# Patient Record
Sex: FEMALE | Race: OTHER | URBAN - METROPOLITAN AREA
[De-identification: names, ages, dates, MRNs, and addresses within clinical notes are randomized per-mention and may not be internally consistent; named-entity substitution may affect disease eponyms.]

---

## 2020-07-24 ENCOUNTER — HOSPITAL ENCOUNTER (OUTPATIENT)
Dept: LAB | Age: 20
Discharge: HOME | End: 2020-07-24

## 2020-07-24 ENCOUNTER — INITIAL PRENATAL (OUTPATIENT)
Dept: FAMILY MEDICINE CLINIC | Age: 20
End: 2020-07-24

## 2020-07-24 VITALS
WEIGHT: 120.6 LBS | HEART RATE: 81 BPM | OXYGEN SATURATION: 98 % | HEIGHT: 60 IN | TEMPERATURE: 98.2 F | RESPIRATION RATE: 16 BRPM | DIASTOLIC BLOOD PRESSURE: 60 MMHG | SYSTOLIC BLOOD PRESSURE: 108 MMHG | BODY MASS INDEX: 23.68 KG/M2

## 2020-07-24 DIAGNOSIS — O09.30 LATE PRENATAL CARE: ICD-10-CM

## 2020-07-24 DIAGNOSIS — Z34.90 ENCOUNTER FOR SUPERVISION OF NORMAL PREGNANCY, ANTEPARTUM, UNSPECIFIED GRAVIDITY: ICD-10-CM

## 2020-07-24 DIAGNOSIS — Z34.90 ENCOUNTER FOR SUPERVISION OF NORMAL PREGNANCY, ANTEPARTUM, UNSPECIFIED GRAVIDITY: Primary | ICD-10-CM

## 2020-07-24 LAB
AMPHET UR QL SCN: NEGATIVE
ANTIBODY SCREEN, EXTERNAL: NEGATIVE
BARBITURATES UR QL SCN: NEGATIVE
BENZODIAZ UR QL: NEGATIVE
BILIRUB UR QL STRIP: NEGATIVE
CANNABINOIDS UR QL SCN: NEGATIVE
CHLAMYDIA, EXTERNAL: NEGATIVE
COCAINE UR QL SCN: NEGATIVE
COMMENT, HOLDF: NORMAL
DRUG SCRN COMMENT,DRGCM: NORMAL
ERYTHROCYTE [DISTWIDTH] IN BLOOD BY AUTOMATED COUNT: 13 % (ref 11.5–14.5)
GLUCOSE UR-MCNC: NEGATIVE MG/DL
HBSAG, EXTERNAL: NEGATIVE
HCT VFR BLD AUTO: 38.1 % (ref 35–47)
HGB BLD-MCNC: 11.9 G/DL (ref 11.5–16)
HIV, EXTERNAL: NON REACTIVE
KETONES P FAST UR STRIP-MCNC: NEGATIVE MG/DL
MCH RBC QN AUTO: 29.3 PG (ref 26–34)
MCHC RBC AUTO-ENTMCNC: 31.2 G/DL (ref 30–36.5)
MCV RBC AUTO: 93.8 FL (ref 80–99)
METHADONE UR QL: NEGATIVE
N. GONORRHEA, EXTERNAL: NEGATIVE
NRBC # BLD: 0 K/UL (ref 0–0.01)
NRBC BLD-RTO: 0 PER 100 WBC
OPIATES UR QL: NEGATIVE
PCP UR QL: NEGATIVE
PH UR STRIP: 6.5 [PH] (ref 4.6–8)
PLATELET # BLD AUTO: 285 K/UL (ref 150–400)
PMV BLD AUTO: 10.6 FL (ref 8.9–12.9)
PROT UR QL STRIP: NEGATIVE
RBC # BLD AUTO: 4.06 M/UL (ref 3.8–5.2)
RPR, EXTERNAL: NON REACTIVE
RUBELLA, EXTERNAL: NORMAL
SAMPLES BEING HELD,HOLD: NORMAL
SP GR UR STRIP: 1.02 (ref 1–1.03)
TYPE, ABO & RH, EXTERNAL: NORMAL
UA UROBILINOGEN AMB POC: NORMAL (ref 0.2–1)
URINALYSIS CLARITY POC: CLEAR
URINALYSIS COLOR POC: YELLOW
URINE BLOOD POC: NEGATIVE
URINE LEUKOCYTES POC: NORMAL
URINE NITRITES POC: NEGATIVE
WBC # BLD AUTO: 9.6 K/UL (ref 3.6–11)

## 2020-07-24 NOTE — PROGRESS NOTES
History and Physical    Patient: Kwasi Sotelo MRN: [id number]  SSN: [id number]    YOB: 2000  Age: 21 y.o. Sex: female      Subjective:      Kwasi Sotelo is a 21 y.o. female G1 at 25w10d who presents for IOB visit. Unplanned pregnancy, is happy about it  FOB involved, they live together  Does not work   Certain of LMP, they are regular   First pos pregnancy test: this month     History reviewed. No pertinent past medical history. History reviewed. No pertinent surgical history. History reviewed. No pertinent family history. Social History     Tobacco Use    Smoking status: Never Smoker    Smokeless tobacco: Never Used   Substance Use Topics    Alcohol use: Never     Frequency: Never      Prior to Admission medications    Medication Sig Start Date End Date Taking? Authorizing Provider   prenatal vit/iron fum/folic ac (RIGHT STEP PRENATAL VITAMINS PO) Take  by mouth. Yes Provider, Historical        Not on File    Review of Systems:  ROS negative except as noted in HPI. Objective:     Vitals:    07/24/20 1321   BP: 108/60   Pulse: 81   Resp: 16   Temp: 98.2 °F (36.8 °C)   TempSrc: Oral   SpO2: 98%   Weight: 120 lb 9.6 oz (54.7 kg)   Height: 4' 11.65\" (1.515 m)        Physical Exam:  See prenatal physical exam.    Assessment/Plan:   21yo G1 @ 25w5d by LMP  1. IUP: IOB labs, GTT, can confirm dating with anatomy scan, drawing NIPT today   2.   Late prenatal care: at 25 weeks, UDS     Signed By: Ruy Liu DO     July 24, 2020 [phone]

## 2020-07-24 NOTE — PROGRESS NOTES
Rosalind Lucas is a 21 y.o. female    Chief Complaint   Patient presents with    Initial Prenatal Visit     Patient is coming in for her initial prenatal visit. Her LMP was 2020 . ALON 2020. She is not having vaginal bleeding. She is having a white vaginal discharge wth no odor or itchiness. She is taking her prenatal vitamins. She is having fetal movement. She is othaving contractions. Never had a pap efore. No other concerns. 1. Have you been to the ER, urgent care clinic since your last visit? Hospitalized since your last visit? No  M  2. Have you seen or consulted any other health care providers outside of the [address] [place] [address] since your last visit? Include any pap smears or colon screening. No      Visit Vitals  /60 (BP 1 Location: Right arm, BP Patient Position: Sitting)   Pulse 81   Temp 98.2 °F (36.8 °C) (Oral)   Resp 16   Ht 4' 11.65\" (1.515 m)   Wt 120 lb 9.6 oz (54.7 kg)   SpO2 98%   BMI 23.83 kg/m²           Health Maintenance Due   Topic Date Due    DTaP/Tdap/Td series (1 - Tdap) 2007    HPV Age 9Y-34Y (1 - 2-dose series) 2011         Medication Reconciliation completed, changes noted.   Please  Update medication list.    Vaginal Bleeding: No  Vaginal discharge: white, no odor or itchiness  Fetal movement: Yes  Contractions: No  Prenatal Vitamins: Yes

## 2020-07-25 LAB
ABO + RH BLD: NORMAL
BLOOD BANK CMNT PATIENT-IMP: NORMAL
BLOOD GROUP ANTIBODIES SERPL: NORMAL
GLUCOSE 1H P 100 G GLC PO SERPL-MCNC: 104 MG/DL (ref 65–140)
HBV SURFACE AG SER QL: <0.1 INDEX
HBV SURFACE AG SER QL: NEGATIVE
HIV 1+2 AB+HIV1 P24 AG SERPL QL IA: NONREACTIVE
HIV12 RESULT COMMENT, HHIVC: NORMAL
RUBV IGG SER-IMP: REACTIVE
RUBV IGG SERPL IA-ACNC: 160 IU/ML
SPECIMEN EXP DATE BLD: NORMAL

## 2020-07-26 LAB
BACTERIA SPEC CULT: NORMAL
CC UR VC: NORMAL
SERVICE CMNT-IMP: NORMAL

## 2020-07-27 LAB
C TRACH DNA SPEC QL NAA+PROBE: NEGATIVE
N GONORRHOEA DNA SPEC QL NAA+PROBE: NEGATIVE
RPR SER QL: NONREACTIVE
SAMPLE TYPE: NORMAL
SERVICE CMNT-IMP: NORMAL
SPECIMEN SOURCE: NORMAL

## 2020-07-28 LAB
DEPRECATED HGB OTHER BLD-IMP: 0 %
HGB A MFR BLD: 97.3 % (ref 96.4–98.8)
HGB A2 MFR BLD COLUMN CHROM: 2.7 % (ref 1.8–3.2)
HGB C MFR BLD: 0 %
HGB F MFR BLD: 0 % (ref 0–2)
HGB FRACT BLD-IMP: NORMAL
HGB S BLD QL SOLY: NEGATIVE
HGB S MFR BLD: 0 %
VZV IGG SER IA-ACNC: 1444 INDEX

## 2020-07-30 ENCOUNTER — HOSPITAL ENCOUNTER (OUTPATIENT)
Dept: PERINATAL CARE | Age: 20
Discharge: HOME | End: 2020-07-30
Attending: OBSTETRICS & GYNECOLOGY
Payer: SUBSIDIZED

## 2020-07-30 PROCEDURE — 76805 OB US >/= 14 WKS SNGL FETUS: CPT | Performed by: OBSTETRICS & GYNECOLOGY

## 2020-08-14 ENCOUNTER — ROUTINE PRENATAL (OUTPATIENT)
Dept: FAMILY MEDICINE CLINIC | Age: 20
End: 2020-08-14
Payer: SUBSIDIZED

## 2020-08-14 VITALS
BODY MASS INDEX: 24.54 KG/M2 | HEIGHT: 60 IN | WEIGHT: 125 LBS | SYSTOLIC BLOOD PRESSURE: 105 MMHG | RESPIRATION RATE: 16 BRPM | TEMPERATURE: 97.8 F | DIASTOLIC BLOOD PRESSURE: 56 MMHG | HEART RATE: 81 BPM | OXYGEN SATURATION: 98 %

## 2020-08-14 DIAGNOSIS — Z34.90 ENCOUNTER FOR SUPERVISION OF NORMAL PREGNANCY, ANTEPARTUM, UNSPECIFIED GRAVIDITY: Primary | ICD-10-CM

## 2020-08-14 DIAGNOSIS — Z3A.28 28 WEEKS GESTATION OF PREGNANCY: ICD-10-CM

## 2020-08-14 DIAGNOSIS — O09.30 LATE PRENATAL CARE: ICD-10-CM

## 2020-08-14 LAB
BILIRUB UR QL STRIP: NEGATIVE
GLUCOSE UR-MCNC: NEGATIVE MG/DL
KETONES P FAST UR STRIP-MCNC: NEGATIVE MG/DL
PH UR STRIP: 6 [PH] (ref 4.6–8)
PROT UR QL STRIP: NEGATIVE
SP GR UR STRIP: 1.03 (ref 1–1.03)
UA UROBILINOGEN AMB POC: NORMAL (ref 0.2–1)
URINALYSIS CLARITY POC: CLEAR
URINALYSIS COLOR POC: NORMAL
URINE BLOOD POC: NEGATIVE
URINE LEUKOCYTES POC: NORMAL
URINE NITRITES POC: NEGATIVE

## 2020-08-14 PROCEDURE — 90471 IMMUNIZATION ADMIN: CPT | Performed by: FAMILY MEDICINE

## 2020-08-14 PROCEDURE — 0502F SUBSEQUENT PRENATAL CARE: CPT | Performed by: FAMILY MEDICINE

## 2020-08-14 PROCEDURE — 90715 TDAP VACCINE 7 YRS/> IM: CPT | Performed by: FAMILY MEDICINE

## 2020-08-14 NOTE — PROGRESS NOTES
Chief Complaint   Patient presents with    Routine Prenatal Visit       Patient identified with 2 patient identifiers (name and D. O. B)    Patient is a  at 28w5d    Leakage of Fluid: NO  Vaginal Bleeding: NO  Fetal Movement: YES  Prenatal vitamins: YES  Having Contractions: NO  Pain: NO    Visit Vitals  /56 (BP 1 Location: Left arm, BP Patient Position: Sitting)   Pulse 81   Temp 97.8 °F (36.6 °C) (Temporal)   Resp 16   Ht 4' 11.65\" (1.515 m)   Wt 125 lb (56.7 kg)   LMP 2020   SpO2 98%   BMI 24.70 kg/m²       Immunization History   Administered Date(s) Administered    Tdap 2020

## 2020-08-14 NOTE — PROGRESS NOTES
Return OB Visit   No concerns, baby moving    Objective:   /56 (BP 1 Location: Left arm, BP Patient Position: Sitting)   Pulse 81   Temp 97.8 °F (36.6 °C) (Temporal)   Resp 16   Ht 4' 11.65\" (1.515 m)   Wt 125 lb (56.7 kg)   LMP 2020   SpO2 98%   BMI 24.70 kg/m²     See flowsheet   Assessment       ICD-10-CM ICD-9-CM    1. Encounter for supervision of normal pregnancy, antepartum, unspecified   Z34.90 V22.1    2. 28 weeks gestation of pregnancy  Z3A.28 V22.2 AMB POC URINALYSIS DIP STICK AUTO W/O MICRO      TETANUS, DIPHTHERIA TOXOIDS AND ACELLULAR PERTUSSIS VACCINE (TDAP), IN INDIVIDS. >=7, IM      WA IMMUNIZ ADMIN,1 SINGLE/COMB VAC/TOXOID     Plan   19yo G1 @ 28w5d by LMP=26wk scan   1. IUP: RH pos, GTT wnl, NIPT wnl, anatomy normal, s/p tdap   2.   Late prenatal care: at 25 weeks, UDS neg    Orders Placed This Encounter    WA IMMUNIZ ADMIN,1 SINGLE/COMB VAC/TOXOID    TETANUS, DIPHTHERIA TOXOIDS AND ACELLULAR PERTUSSIS VACCINE (TDAP), IN INDIVIDS. >=7, IM    AMB POC URINALYSIS DIP STICK AUTO W/O MICRO         Kiana Bergeorn,

## 2020-08-24 ENCOUNTER — TELEPHONE (OUTPATIENT)
Dept: FAMILY MEDICINE CLINIC | Age: 20
End: 2020-08-24

## 2020-08-26 ENCOUNTER — ROUTINE PRENATAL (OUTPATIENT)
Dept: FAMILY MEDICINE CLINIC | Age: 20
End: 2020-08-26

## 2020-08-31 ENCOUNTER — ROUTINE PRENATAL (OUTPATIENT)
Dept: FAMILY MEDICINE CLINIC | Age: 20
End: 2020-08-31

## 2020-08-31 NOTE — PROGRESS NOTES
[facility] #[id number]    Attempted to call patient but phone no is incorrect and the person that answered is not Ms. Albarado. Called friend Deborah) on the HIPAA form who picked up but then did not speak when asked about Ms. Albarado information. Patient does have a follow up visit in clinic on 9/11.

## 2020-08-31 NOTE — Clinical Note
LUIS    Attempted to call patient but phone no is incorrect and the person that answered is not Ms. Albarado. Called friend Deborah) on the HIPAA form who picked up but then did not speak when asked about MsAlvarado Verena information. Patient does have a follow up visit in clinic on 9/11.

## 2020-09-10 ENCOUNTER — APPOINTMENT (OUTPATIENT)
Dept: FAMILY MEDICINE CLINIC | Age: 20
End: 2020-09-10

## 2020-09-25 ENCOUNTER — ROUTINE PRENATAL (OUTPATIENT)
Dept: FAMILY MEDICINE CLINIC | Age: 20
End: 2020-09-25
Payer: SUBSIDIZED

## 2020-09-25 VITALS
OXYGEN SATURATION: 98 % | WEIGHT: 135 LBS | DIASTOLIC BLOOD PRESSURE: 74 MMHG | HEART RATE: 80 BPM | SYSTOLIC BLOOD PRESSURE: 121 MMHG | HEIGHT: 60 IN | TEMPERATURE: 98.4 F | BODY MASS INDEX: 26.5 KG/M2

## 2020-09-25 DIAGNOSIS — Z3A.34 34 WEEKS GESTATION OF PREGNANCY: Primary | ICD-10-CM

## 2020-09-25 DIAGNOSIS — O09.30 LATE PRENATAL CARE: ICD-10-CM

## 2020-09-25 LAB
BILIRUB UR QL STRIP: NEGATIVE
GLUCOSE UR-MCNC: NEGATIVE MG/DL
KETONES P FAST UR STRIP-MCNC: NEGATIVE MG/DL
PH UR STRIP: 7 [PH] (ref 4.6–8)
PROT UR QL STRIP: NEGATIVE
SP GR UR STRIP: 1.01 (ref 1–1.03)
UA UROBILINOGEN AMB POC: NORMAL (ref 0.2–1)
URINALYSIS CLARITY POC: CLEAR
URINALYSIS COLOR POC: YELLOW
URINE BLOOD POC: NEGATIVE
URINE LEUKOCYTES POC: NORMAL
URINE NITRITES POC: NEGATIVE

## 2020-09-25 PROCEDURE — 0502F SUBSEQUENT PRENATAL CARE: CPT | Performed by: FAMILY MEDICINE

## 2020-09-25 PROCEDURE — 81003 URINALYSIS AUTO W/O SCOPE: CPT | Performed by: FAMILY MEDICINE

## 2020-09-25 PROCEDURE — 90471 IMMUNIZATION ADMIN: CPT | Performed by: FAMILY MEDICINE

## 2020-09-25 PROCEDURE — 90686 IIV4 VACC NO PRSV 0.5 ML IM: CPT | Performed by: FAMILY MEDICINE

## 2020-09-25 NOTE — PROGRESS NOTES
Chief Complaint   Patient presents with    Routine Prenatal Visit     . 34w5d. NO LOF, no bleeding. +fetal movement. No daylin shaw     Visit Vitals  /74 (BP 1 Location: Left arm, BP Patient Position: Sitting)   Pulse 80   Temp 98.4 °F (36.9 °C) (Temporal)   Ht 4' 11.65\" (1.515 m)   Wt 135 lb (61.2 kg)   SpO2 98%   BMI 26.68 kg/m²     1. Have you been to the ER, urgent care clinic since your last visit? Hospitalized since your last visit? No    2. Have you seen or consulted any other health care providers outside of the [address] [place] [address] since your last visit? Include any pap smears or colon screening.  No

## 2020-09-25 NOTE — PROGRESS NOTES
I reviewed with the resident the medical history and the resident's findings on the physical examination. I discussed with the resident the patient's diagnosis and concur with the plan. 21yo G1 @ 34w5d by LMP=26wk scan   1. IUP: RH pos, GTT wnl, NIPT wnl, anatomy normal, s/p tdap and flu   2. Late prenatal care: at 25 weeks, UDS neg  3.   S<D: scheduling growth scan

## 2020-09-25 NOTE — PROGRESS NOTES
Return OB Visit       Subjective:   Milan Verduzco 21 y.o. Lorenzo Gavel   ALON: 11/1/2020, Date entered prior to episode creation  GA: 34w5d. States she does not have abdominal pain, chest pain, contractions, fever, headache, nausea and vomiting, pelvic pressure, right upper quadrant pain, shortness of breath, swelling, vaginal bleeding, vaginal leaking of fluid  and visual disturbances, + fetal movement. She is taking PNV and she is eating healthy. Phone no [phone], will have Namrata update the chart     Allergies- reviewed:   No Known Allergies  Medications- reviewed:   Current Outpatient Medications   Medication Sig    prenatal vit/iron fum/folic ac (RIGHT STEP PRENATAL VITAMINS PO) Take  by mouth. No current facility-administered medications for this visit. Past Medical History- reviewed:  No past medical history on file. Past Surgical History- reviewed:   No past surgical history on file.   Social History- reviewed:  Social History     Socioeconomic History    Marital status: SINGLE     Spouse name: Not on file    Number of children: Not on file    Years of education: Not on file    Highest education level: Not on file   Occupational History    Not on file   Social Needs    Financial resource strain: Not on file    Food insecurity     Worry: Not on file     Inability: Not on file    Transportation needs     Medical: Not on file     Non-medical: Not on file   Tobacco Use    Smoking status: Never Smoker    Smokeless tobacco: Never Used   Substance and Sexual Activity    Alcohol use: Never     Frequency: Never    Drug use: Never    Sexual activity: Yes   Lifestyle    Physical activity     Days per week: Not on file     Minutes per session: Not on file    Stress: Not on file   Relationships    Social connections     Talks on phone: Not on file     Gets together: Not on file     Attends Jew service: Not on file     Active member of club or organization: Not on file Attends meetings of clubs or organizations: Not on file     Relationship status: Not on file    Intimate partner violence     Fear of current or ex partner: Not on file     Emotionally abused: Not on file     Physically abused: Not on file     Forced sexual activity: Not on file   Other Topics Concern    Not on file   Social History Narrative    Not on file     Immunizations- reviewed:   Immunization History   Administered Date(s) Administered    Influenza Vaccine (Quad) PF 2020    Tdap 2020     Objective:     Visit Vitals  /74 (BP 1 Location: Left arm, BP Patient Position: Sitting)   Pulse 80   Temp 98.4 °F (36.9 °C) (Temporal)   Ht 4' 11.65\" (1.515 m)   Wt 135 lb (61.2 kg)   LMP 2020   SpO2 98%   BMI 26.68 kg/m²       Physical Exam:  GENERAL APPEARANCE: alert, well appearing, in no apparent distress  ABDOMEN: gravid, fundal height 30 cm, FHT present at 150 bpm  PSYCH: normal mood and affect    Labs  Recent Results (from the past 12 hour(s))   AMB POC URINALYSIS DIP STICK AUTO W/O MICRO    Collection Time: 20  2:04 PM   Result Value Ref Range    Color (UA POC) Yellow     Clarity (UA POC) Clear     Glucose (UA POC) Negative Negative    Bilirubin (UA POC) Negative Negative    Ketones (UA POC) Negative Negative    Specific gravity (UA POC) 1.015 1.001 - 1.035    Blood (UA POC) Negative Negative    pH (UA POC) 7.0 4.6 - 8.0    Protein (UA POC) Negative Negative    Urobilinogen (UA POC) 0.2 mg/dL 0.2 - 1    Nitrites (UA POC) Negative Negative    Leukocyte esterase (UA POC) 1+ Negative         Plan   20 y.o.  34w5d ALON 2020, Date entered prior to episode creation here for return OB visit     SIUP   - RH pos, Antibody screen- neg, Rb immune, VZ immune, HBsAg titer- neg, RPR- neg, HIV- neg, pap smear- NILM, gonorrhea/chlamydia- neg, UCX mixed urogenital henna, 1 hr GTT wnl   - Anatomy scan wnl, follow up as CI \  - NIPT low risk     Pregnancy Problems:  - Late prenatal care: at 25 weeks, UDS neg  - S<D: Measuring 30 weeks today at 34w5d, will obtain growth scan    Today's visit:   - UA: 1+ LE, otherwise normal   - Flu vaccine today     Follow up visit: Follow up in 2 weeks (10/12 with Dr. Isabella Estrella)    Orders Placed This Encounter    INFLUENZA VIRUS VAC QUAD,SPLIT,PRESV FREE SYRINGE IM (Flulaval, Fluzone, Fluarix) (32640)    AMB POC URINALYSIS DIP STICK AUTO W/O MICRO     Labor precautions discussed, including: Regular painful contractions, lasting for greater than one hour, taking your breath away; any vaginal bleeding; any leakage of fluid; or absent or decreased fetal movement. Call M.D. on call if any of these symptoms or signs occur. I have discussed the diagnosis with the patient and the intended plan as seen in the above orders. The patient has received an after-visit summary and questions were answered concerning future plans. I have discussed medication side effects and warnings with the patient as well. Informed pt to return to the office or go to the ER if she experiences vaginal bleeding, vaginal discharge, leaking of fluid, pelvic cramping.     Pt discussed with Dr. Carolina Feliz (attending physician)    Tacho Aldana DO  Family Medicine Resident

## 2020-10-05 ENCOUNTER — HOSPITAL ENCOUNTER (OUTPATIENT)
Dept: PERINATAL CARE | Age: 20
Discharge: HOME | End: 2020-10-05
Attending: OBSTETRICS & GYNECOLOGY

## 2020-10-12 ENCOUNTER — ROUTINE PRENATAL (OUTPATIENT)
Dept: FAMILY MEDICINE CLINIC | Age: 20
End: 2020-10-12
Payer: SUBSIDIZED

## 2020-10-12 VITALS
OXYGEN SATURATION: 99 % | SYSTOLIC BLOOD PRESSURE: 120 MMHG | DIASTOLIC BLOOD PRESSURE: 70 MMHG | RESPIRATION RATE: 18 BRPM | TEMPERATURE: 97.8 F | HEART RATE: 72 BPM | HEIGHT: 60 IN | WEIGHT: 141 LBS | BODY MASS INDEX: 27.68 KG/M2

## 2020-10-12 DIAGNOSIS — Z3A.37 37 WEEKS GESTATION OF PREGNANCY: Primary | ICD-10-CM

## 2020-10-12 DIAGNOSIS — Z3A.37 37 WEEKS GESTATION OF PREGNANCY: ICD-10-CM

## 2020-10-12 DIAGNOSIS — Z34.90 PRENATAL CARE, ANTEPARTUM: ICD-10-CM

## 2020-10-12 LAB
BILIRUB UR QL STRIP: NEGATIVE
GLUCOSE UR-MCNC: NEGATIVE MG/DL
GRBS, EXTERNAL: NEGATIVE
KETONES P FAST UR STRIP-MCNC: NEGATIVE MG/DL
PH UR STRIP: 7 [PH] (ref 4.6–8)
PROT UR QL STRIP: NEGATIVE
SP GR UR STRIP: 1.02 (ref 1–1.03)
UA UROBILINOGEN AMB POC: NORMAL (ref 0.2–1)
URINALYSIS CLARITY POC: NORMAL
URINALYSIS COLOR POC: YELLOW
URINE BLOOD POC: NEGATIVE
URINE LEUKOCYTES POC: NORMAL
URINE NITRITES POC: NEGATIVE

## 2020-10-12 PROCEDURE — 0502F SUBSEQUENT PRENATAL CARE: CPT | Performed by: STUDENT IN AN ORGANIZED HEALTH CARE EDUCATION/TRAINING PROGRAM

## 2020-10-12 NOTE — PATIENT INSTRUCTIONS
Semana 40 de sauceda embarazo: Instrucciones de cuidado  Week 37 of Your Pregnancy: Care Instructions  Instrucciones de cuidado    Usted está cerca del final de sauceda embarazo, y probablemente esté bastante incómoda. Puede ser más difícil caminar. Acostarse probablemente tampoco sea cómodo. Podría tener dificultad para dormir o para permanecer dormida. La mayoría de las mujeres miya a nita entre las 40 y 43 semanas. Farnaz es un buen momento para pensar en preparar un maletín para el hospital con los artículos que necesitará. Entonces estará lista para cuando comience el Vicki David Galvan. La atención de seguimiento es alejandra parte clave de sauceda tratamiento y seguridad. Asegúrese de hacer y acudir a todas las citas, y llame a sauceda médico si está teniendo problemas. También es alejandra buena idea saber los resultados de zeina exámenes y mantener alejandra lista de los medicamentos que natasha. ¿Cómo puede cuidarse en el hogar? Aprenda sobre el amamantamiento  · El amamantamiento es lo mejor para sauceda bebé y Kerri Gravely es monahan para usted. · La leche materna tiene anticuerpos que ayudan al bebé a combatir las infecciones. · Las madres que amamantan suelen bajar de peso más rápidamente, debido a que elaborar leche quema calorías. · Informarse acerca de las mejores maneras de sostener a sauceda bebé le facilitará el amamantamiento. · Deje que sauceda damir bañe y [employer] pañales del bebé para que no se sienta excluida. Acurrúquense juntos cuando amamante a sauceda bebé. · Es posible que desee aprender a usar un sacaleches y guardar sauceda [place]. · Si elige alimentar a sauceda bebé con biberón, hágalo de la [employer] resulte más parecida al amamantamiento para que pueda establecer un vínculo con sauceda bebé. Siempre sostenga al bebé y el biberón. No apoye el biberón ni deje que sauceda bebé se quede dormido con él. Aprenda sobre el llanto  · Es normal que los bebés lloren de 1 a 3 horas al día. Algunos lloran más, otros menos.   · Los bebés no lloran para causarle molestias ni porque usted sea [employer]. · Llorar es la forma de comunicarse que tiene el bebé. Sauceda bebé puede [employer] o gases, necesitar un cambio de pañal, sentir frío o calor, sentirse solo o tenso. A veces, los bebés lloran por motivos desconocidos. · Si usted responde a las necesidades de sauceda bebé, luis fernando aprenderá a confiar en usted. · Intente mantener la calma cuando sauceda bebé llore. Sauceda bebé se puede sentir más molesto si siente que usted [place]. Sepa cómo cuidar a sauceda recién nacido  · El muñón del cordón umbilical de sauceda bebé se caerá solo, por lo general entre las semanas 1 y 2. Para cuidar la juanita del cordón umbilical de sauceda bebé:  ? Limpie la juanita de la parte inferior del cordón umbilical 2 o 3 veces al día. ? Ponga especial atención en la juanita en donde el cordón se fija a la piel. ? Mantenga el pañal doblado debajo del cordón. ? Utilice alejandra toallita húmeda o algodón para darle un baño de esponja a sauceda bebé hasta que se le haya caído el muñón. · La primera evacuación intestinal oscura de sauceda bebé se conoce katherine meconio. Después del meconio, el bebé tendrá zeina propios hábitos de evacuación intestinal.  ? Algunos bebés, especialmente aquellos que se alimentan con [address], tienen varias evacuaciones al día. Otros tienen [employer] al día, o alejandra cada 2 o 3 días. ? Los bebés que son amamantados a menudo tienen evacuaciones sueltas amarillentas. Los bebés que se alimentan con leche de fórmula evacuan heces más sólidas. ? Si sauceda bebé tiene evacuaciones katherine bolitas pequeñas, está estreñido. Después de 2 wendie de estreñimiento, llame al médico de sauceda bebé. · Si sauceda bebé va a ser Josy Risk, usted puede cuidarlo en el hogar. ? Enjuáguele delicadamente el pene con agua tibia cada vez que le cambie los pañales. No intente retirar la membrana que se forma sobre el pene. Esta membrana desaparecerá por sí vicky. Séquele la juanita con toques suaves de toalla.   ? [employer] a base de petróleo, katherine cassidya, en la juanita del pañal que tendrá contacto con el pene de sauceda bebé. Farmers evitará que el pañal se le pegue al bebé. ? Pregúntele al médico acerca de darle acetaminofén (Tylenol) a sauceda bebé para el dolor. ¿Dónde puede encontrar más información en inglés? Vaya a [email or web address]ox.com/  Abiodun [id number] en la búsqueda para aprender más acerca de \"Semana 40 de sauceda embarazo: Instrucciones de cuidado. \"  Revisado: 11 febrero, 2020               Versión del contenido: 12.6  © 4069-0163 [employer]. Las instrucciones de cuidado fueron adaptadas bajo licencia por [employer] (which disclaims liability or warranty for this information). Si usted tiene [place] afección médica o sobre estas instrucciones, siempre pregunte a sauceda profesional de mickey. [employer] niega toda garantía o responsabilidad por sauceda uso de esta información.

## 2020-10-12 NOTE — PROGRESS NOTES
Identified Patient with two Patient identifiers (Name and ). Two Patient Identifiers confirmed. Reviewed record in preparation for visit and have obtained necessary documentation. Chief Complaint   Patient presents with    Routine Prenatal Visit     37w1d       Visit Vitals  /70 (BP 1 Location: Right arm, BP Patient Position: Sitting)   Pulse 72   Temp 97.8 °F (36.6 °C) (Temporal)   Resp 18   Ht 4' 11.5\" (1.511 m)   Wt 141 lb (64 kg)   SpO2 99%   BMI 28.00 kg/m²       1. Have you been to the ER, urgent care clinic since your last visit? Hospitalized since your last visit? No    2. Have you seen or consulted any other health care providers outside of the [address] [place] [address] since your last visit? Include any pap smears or colon screening.  No

## 2020-10-12 NOTE — PROGRESS NOTES
I reviewed with the resident the medical history and the resident's findings on the physical examination. I discussed with the resident the patient's diagnosis and concur with the plan. 19yo G1 @ 37w1d by LMP=26wk scan   1. IUP: RH pos, GTT wnl, NIPT wnl, anatomy normal, s/p tdap and flu, GBS collected   2. Late prenatal care: at 25 weeks, UDS neg  3.   S<D: 18th% on 10/5, f/up as CI per M

## 2020-10-15 LAB
BACTERIA SPEC CULT: NORMAL
SERVICE CMNT-IMP: NORMAL

## 2020-10-16 ENCOUNTER — ANESTHESIA (OUTPATIENT)
Dept: LABOR AND DELIVERY | Age: 20
End: 2020-10-16
Payer: SUBSIDIZED

## 2020-10-16 ENCOUNTER — HOSPITAL ENCOUNTER (INPATIENT)
Age: 20
LOS: 2 days | Discharge: HOME | End: 2020-10-18
Attending: FAMILY MEDICINE | Admitting: OBSTETRICS & GYNECOLOGY
Payer: SUBSIDIZED

## 2020-10-16 ENCOUNTER — ANESTHESIA EVENT (OUTPATIENT)
Dept: LABOR AND DELIVERY | Age: 20
End: 2020-10-16
Payer: SUBSIDIZED

## 2020-10-16 PROBLEM — Z34.90 PREGNANCY: Status: ACTIVE | Noted: 2020-10-16

## 2020-10-16 LAB
ALBUMIN SERPL-MCNC: 2.8 G/DL (ref 3.5–5)
ALBUMIN/GLOB SERPL: 0.7 {RATIO} (ref 1.1–2.2)
ALP SERPL-CCNC: 197 U/L (ref 45–117)
ALT SERPL-CCNC: 19 U/L (ref 12–78)
ANION GAP SERPL CALC-SCNC: 7 MMOL/L (ref 5–15)
AST SERPL-CCNC: 17 U/L (ref 15–37)
BASOPHILS # BLD: 0.1 K/UL (ref 0–0.1)
BASOPHILS NFR BLD: 0 % (ref 0–1)
BILIRUB SERPL-MCNC: 0.3 MG/DL (ref 0.2–1)
BUN SERPL-MCNC: 8 MG/DL (ref 6–20)
BUN/CREAT SERPL: 14 (ref 12–20)
CALCIUM SERPL-MCNC: 8.9 MG/DL (ref 8.5–10.1)
CHLORIDE SERPL-SCNC: 109 MMOL/L (ref 97–108)
CO2 SERPL-SCNC: 22 MMOL/L (ref 21–32)
COMMENT, HOLDF: NORMAL
COVID-19 RAPID TEST, COVR: NOT DETECTED
CREAT SERPL-MCNC: 0.58 MG/DL (ref 0.55–1.02)
CREAT UR-MCNC: 90 MG/DL
DIFFERENTIAL METHOD BLD: ABNORMAL
EOSINOPHIL # BLD: 0.2 K/UL (ref 0–0.4)
EOSINOPHIL NFR BLD: 2 % (ref 0–7)
ERYTHROCYTE [DISTWIDTH] IN BLOOD BY AUTOMATED COUNT: 14 % (ref 11.5–14.5)
GLOBULIN SER CALC-MCNC: 4.2 G/DL (ref 2–4)
GLUCOSE SERPL-MCNC: 83 MG/DL (ref 65–100)
HCT VFR BLD AUTO: 34.5 % (ref 35–47)
HEALTH STATUS, XMCV2T: NORMAL
HGB BLD-MCNC: 10.9 G/DL (ref 11.5–16)
IMM GRANULOCYTES # BLD AUTO: 0.1 K/UL (ref 0–0.04)
IMM GRANULOCYTES NFR BLD AUTO: 1 % (ref 0–0.5)
LDH SERPL L TO P-CCNC: 159 U/L (ref 81–246)
LYMPHOCYTES # BLD: 1.9 K/UL (ref 0.8–3.5)
LYMPHOCYTES NFR BLD: 15 % (ref 12–49)
MCH RBC QN AUTO: 25.7 PG (ref 26–34)
MCHC RBC AUTO-ENTMCNC: 31.6 G/DL (ref 30–36.5)
MCV RBC AUTO: 81.4 FL (ref 80–99)
MONOCYTES # BLD: 0.9 K/UL (ref 0–1)
MONOCYTES NFR BLD: 7 % (ref 5–13)
NEUTS SEG # BLD: 9.5 K/UL (ref 1.8–8)
NEUTS SEG NFR BLD: 75 % (ref 32–75)
NRBC # BLD: 0 K/UL (ref 0–0.01)
NRBC BLD-RTO: 0 PER 100 WBC
PLATELET # BLD AUTO: 384 K/UL (ref 150–400)
PMV BLD AUTO: 11.4 FL (ref 8.9–12.9)
POTASSIUM SERPL-SCNC: 4.1 MMOL/L (ref 3.5–5.1)
PROT SERPL-MCNC: 7 G/DL (ref 6.4–8.2)
PROT UR-MCNC: 26 MG/DL (ref 0–11.9)
PROT/CREAT UR-RTO: 0.3
RBC # BLD AUTO: 4.24 M/UL (ref 3.8–5.2)
SAMPLES BEING HELD,HOLD: NORMAL
SODIUM SERPL-SCNC: 138 MMOL/L (ref 136–145)
SOURCE, COVRS: NORMAL
SPECIMEN SOURCE, FCOV2M: NORMAL
SPECIMEN TYPE, XMCV1T: NORMAL
URATE SERPL-MCNC: 4.4 MG/DL (ref 2.6–6)
WBC # BLD AUTO: 12.7 K/UL (ref 3.6–11)

## 2020-10-16 PROCEDURE — 36415 COLL VENOUS BLD VENIPUNCTURE: CPT

## 2020-10-16 PROCEDURE — 85025 COMPLETE CBC W/AUTO DIFF WBC: CPT

## 2020-10-16 PROCEDURE — 84156 ASSAY OF PROTEIN URINE: CPT

## 2020-10-16 PROCEDURE — 75410000003 HC RECOV DEL/VAG/CSECN EA 0.5 HR: Performed by: OBSTETRICS & GYNECOLOGY

## 2020-10-16 PROCEDURE — 74011250636 HC RX REV CODE- 250/636: Performed by: OBSTETRICS & GYNECOLOGY

## 2020-10-16 PROCEDURE — 74011250636 HC RX REV CODE- 250/636: Performed by: ANESTHESIOLOGY

## 2020-10-16 PROCEDURE — 77030014125 HC TY EPDRL BBMI -B: Performed by: ANESTHESIOLOGY

## 2020-10-16 PROCEDURE — 76060000078 HC EPIDURAL ANESTHESIA: Performed by: ANESTHESIOLOGY

## 2020-10-16 PROCEDURE — 76815 OB US LIMITED FETUS(S): CPT | Performed by: OBSTETRICS & GYNECOLOGY

## 2020-10-16 PROCEDURE — 75410000002 HC LABOR FEE PER 1 HR: Performed by: OBSTETRICS & GYNECOLOGY

## 2020-10-16 PROCEDURE — 59025 FETAL NON-STRESS TEST: CPT

## 2020-10-16 PROCEDURE — 74011000250 HC RX REV CODE- 250

## 2020-10-16 PROCEDURE — 75810000275 HC EMERGENCY DEPT VISIT NO LEVEL OF CARE

## 2020-10-16 PROCEDURE — 00HU33Z INSERTION OF INFUSION DEVICE INTO SPINAL CANAL, PERCUTANEOUS APPROACH: ICD-10-PCS | Performed by: ANESTHESIOLOGY

## 2020-10-16 PROCEDURE — 3E0R3BZ INTRODUCTION OF ANESTHETIC AGENT INTO SPINAL CANAL, PERCUTANEOUS APPROACH: ICD-10-PCS | Performed by: ANESTHESIOLOGY

## 2020-10-16 PROCEDURE — 65270000029 HC RM PRIVATE

## 2020-10-16 PROCEDURE — 75410000000 HC DELIVERY VAGINAL/SINGLE: Performed by: OBSTETRICS & GYNECOLOGY

## 2020-10-16 PROCEDURE — 74011250636 HC RX REV CODE- 250/636: Performed by: STUDENT IN AN ORGANIZED HEALTH CARE EDUCATION/TRAINING PROGRAM

## 2020-10-16 PROCEDURE — 74011000250 HC RX REV CODE- 250: Performed by: ANESTHESIOLOGY

## 2020-10-16 PROCEDURE — 83615 LACTATE (LD) (LDH) ENZYME: CPT

## 2020-10-16 PROCEDURE — 80053 COMPREHEN METABOLIC PANEL: CPT

## 2020-10-16 PROCEDURE — 84550 ASSAY OF BLOOD/URIC ACID: CPT

## 2020-10-16 PROCEDURE — 87635 SARS-COV-2 COVID-19 AMP PRB: CPT

## 2020-10-16 RX ORDER — ACETAMINOPHEN 325 MG/1
650 TABLET ORAL
Status: DISCONTINUED | OUTPATIENT
Start: 2020-10-16 | End: 2020-10-17 | Stop reason: HOSPADM

## 2020-10-16 RX ORDER — LIDOCAINE HYDROCHLORIDE 10 MG/ML
20 INJECTION INFILTRATION; PERINEURAL ONCE
Status: COMPLETED | OUTPATIENT
Start: 2020-10-17 | End: 2020-10-16

## 2020-10-16 RX ORDER — NALBUPHINE HYDROCHLORIDE 10 MG/ML
10 INJECTION, SOLUTION INTRAMUSCULAR; INTRAVENOUS; SUBCUTANEOUS
Status: DISCONTINUED | OUTPATIENT
Start: 2020-10-16 | End: 2020-10-17 | Stop reason: HOSPADM

## 2020-10-16 RX ORDER — EPHEDRINE SULFATE/0.9% NACL/PF 50 MG/5 ML
10 SYRINGE (ML) INTRAVENOUS
Status: DISCONTINUED | OUTPATIENT
Start: 2020-10-16 | End: 2020-10-17 | Stop reason: HOSPADM

## 2020-10-16 RX ORDER — SODIUM CHLORIDE, SODIUM LACTATE, POTASSIUM CHLORIDE, CALCIUM CHLORIDE 600; 310; 30; 20 MG/100ML; MG/100ML; MG/100ML; MG/100ML
125 INJECTION, SOLUTION INTRAVENOUS CONTINUOUS
Status: DISCONTINUED | OUTPATIENT
Start: 2020-10-16 | End: 2020-10-18 | Stop reason: HOSPADM

## 2020-10-16 RX ORDER — FENTANYL CITRATE 50 UG/ML
INJECTION, SOLUTION INTRAMUSCULAR; INTRAVENOUS AS NEEDED
Status: DISCONTINUED | OUTPATIENT
Start: 2020-10-16 | End: 2020-10-16 | Stop reason: HOSPADM

## 2020-10-16 RX ORDER — OXYTOCIN/RINGER'S LACTATE 30/500 ML
10 PLASTIC BAG, INJECTION (ML) INTRAVENOUS AS NEEDED
Status: COMPLETED | OUTPATIENT
Start: 2020-10-16 | End: 2020-10-17

## 2020-10-16 RX ORDER — LIDOCAINE HYDROCHLORIDE 10 MG/ML
INJECTION INFILTRATION; PERINEURAL
Status: COMPLETED
Start: 2020-10-16 | End: 2020-10-16

## 2020-10-16 RX ORDER — OXYTOCIN/RINGER'S LACTATE 30/500 ML
95 PLASTIC BAG, INJECTION (ML) INTRAVENOUS AS NEEDED
Status: DISCONTINUED | OUTPATIENT
Start: 2020-10-16 | End: 2020-10-18 | Stop reason: HOSPADM

## 2020-10-16 RX ORDER — LIDOCAINE HYDROCHLORIDE AND EPINEPHRINE 15; 5 MG/ML; UG/ML
INJECTION, SOLUTION EPIDURAL AS NEEDED
Status: DISCONTINUED | OUTPATIENT
Start: 2020-10-16 | End: 2020-10-16 | Stop reason: HOSPADM

## 2020-10-16 RX ORDER — FENTANYL/BUPIVACAINE/NS/PF 2-1250MCG
10 PREFILLED PUMP RESERVOIR EPIDURAL CONTINUOUS
Status: DISCONTINUED | OUTPATIENT
Start: 2020-10-16 | End: 2020-10-17 | Stop reason: HOSPADM

## 2020-10-16 RX ADMIN — OXYTOCIN 30000 MILLI-UNITS: 10 INJECTION, SOLUTION INTRAMUSCULAR; INTRAVENOUS at 23:45

## 2020-10-16 RX ADMIN — FENTANYL CITRATE 75 MCG: 0.05 INJECTION, SOLUTION INTRAMUSCULAR; INTRAVENOUS at 19:54

## 2020-10-16 RX ADMIN — SODIUM CHLORIDE, SODIUM LACTATE, POTASSIUM CHLORIDE, AND CALCIUM CHLORIDE 125 ML/HR: 600; 310; 30; 20 INJECTION, SOLUTION INTRAVENOUS at 17:40

## 2020-10-16 RX ADMIN — FENTANYL CITRATE 25 MCG: 0.05 INJECTION, SOLUTION INTRAMUSCULAR; INTRAVENOUS at 19:47

## 2020-10-16 RX ADMIN — Medication 10 ML/HR: at 20:12

## 2020-10-16 RX ADMIN — SODIUM CHLORIDE, SODIUM LACTATE, POTASSIUM CHLORIDE, AND CALCIUM CHLORIDE 125 ML/HR: 600; 310; 30; 20 INJECTION, SOLUTION INTRAVENOUS at 20:15

## 2020-10-16 RX ADMIN — LIDOCAINE HYDROCHLORIDE AND EPINEPHRINE 3.5 ML: 15; 5 INJECTION, SOLUTION EPIDURAL at 19:54

## 2020-10-16 RX ADMIN — SODIUM CHLORIDE, SODIUM LACTATE, POTASSIUM CHLORIDE, AND CALCIUM CHLORIDE 1000 ML: 600; 310; 30; 20 INJECTION, SOLUTION INTRAVENOUS at 19:15

## 2020-10-16 RX ADMIN — LIDOCAINE HYDROCHLORIDE 10 ML: 10 INJECTION, SOLUTION INFILTRATION; PERINEURAL at 23:45

## 2020-10-16 RX ADMIN — LIDOCAINE HYDROCHLORIDE 10 ML: 10 INJECTION INFILTRATION; PERINEURAL at 23:45

## 2020-10-16 NOTE — PROGRESS NOTES
1355 pt arrived from ed, with complaint of contractions q 5 minutes, pt states she vomited x 1.   Placed pt on efm and using interperter [id number] through stratus, pt denies leakage of fluid or vaginal bleeding, pt states she does feel fetal movement  1405 pt states she has not drank any water today and last ate this morning  1432 residence in and aware of bp ranges and pt arrived for complaint of contraction, confirming vtx position, sve performed by resident and myself    1520 dr Samy Allen at nurses station reviewed efm tracing and bp ranges, order received to let md know if bp is 140 or greater systolic    8539 sbar report given to isael gaspar rn

## 2020-10-16 NOTE — H&P
[address] [facility] [address] [place] [address]   Office [phone], Fax [phone]      History & Physical    Name: Luigi Kellogg MRN: [id number]  SSN: [id number]    YOB: 2000  Age: 21 y.o. Sex: female      Subjective:     Reason for Admission:  Pregnancy and Contractions    History of Present Illness: Ms. Stephon Ford is a 21 y.o. [facility] female with an estimated gestational age of 37w6d with Estimated Date of Delivery: 20 by LMP c/w US at 26 weeks. Patient complains of mild contractions  that started yesterday at 1800, she feels the contractions on her abdomen and they move around to her lower back, 3/10 in intensity, every 15 minutes. Today the contractions has increased in frequency and intensity, every 5 minutes and 8/10, respectively. She felt nauseated today in the morning while having breakfast and had one episode of vomiting. Pregnancy has been complicated by Late prenatal care. Patient denies fever, chills, HA, vision disturbances, RUQ pain, chest pain, SOB, N/V, urinary problems, vaginal bleeding, foul smelling vaginal discharge, loss of fluid, or contractions. Endorses good fetal movement. OB History    Para Term  AB Living   1             SAB TAB Ectopic Molar Multiple Live Births                    # Outcome Date GA Lbr Kory/2nd Weight Sex Delivery Anes PTL Lv   1 Current              No past medical history on file. No past surgical history on file. Social History     Occupational History    Not on file   Tobacco Use    Smoking status: Never Smoker    Smokeless tobacco: Never Used   Substance and Sexual Activity    Alcohol use: Never     Frequency: Never    Drug use: Never    Sexual activity: Yes      No family history on file. No Known Allergies  Prior to Admission medications    Medication Sig Start Date End Date Taking? Authorizing Provider   prenatal vit/iron fum/folic ac (RIGHT STEP PRENATAL VITAMINS PO) Take  by mouth. Provider, Historical        Review of Systems:  A comprehensive review of systems was negative except for that written in the History of Present Illness. Objective:     Vitals:    Vitals:    10/16/20 1329   BP: 135/84   Pulse: 93   Resp: 16   Temp: 98.8 °F (37.1 °C)   SpO2: 99%   Weight: 63.5 kg (139 lb 15.9 oz)      Temp (24hrs), Av.8 °F (37.1 °C), Min:98.8 °F (37.1 °C), Max:98.8 °F (37.1 °C)    BP  Min: 135/84  Max: 135/84     Physical Exam:  Patient without distress. Heart: Regular rate and rhythm, S1S2 present or without murmur or extra heart sounds  Lung: clear to auscultation throughout lung fields, no wheezes, no rales, no rhonchi and normal respiratory effort  Abdomen: soft, nontender  Cervical Exam: 1.5 cm dilated/70% effaced/-2 station. Performed by Dr. Zoya Crisostomo. Checked by nurse. Lower Extremities: No LE edema     Membranes:  Intact. Uterine Activity:  Frequency: Every 6 minutes, duration 60-90 seconds  Fetal Heart Rate:  Reactive  Baseline: 145 per minute  Variability: moderate  Accelerations: yes  Decelerations: none     Vertex Position confirmed by US. Lab/Data Review:  No results found for this or any previous visit (from the past 24 hour(s)). Assessment and Plan:     Ms. Deandre Lozano is a 21 y.o.   female with an estimated gestational age of 37w6d who is rule out labor. PNL: O +, RH pos, GBS Neg, Antibody screen- neg, Rb immune, VZ immune, HBsAg titer- neg, RPR- neg, HIV- neg, pap smear- NILM, gonorrhea/chlamydia- neg, UCX mixed urogenital henna, 1 hr GTT wnl. Anatomy scan wnl, NIPT low risk. S/p TDap and Influenza. MFM US (10/5): EFW 18% (low normal range), normal amniotic fluid, cephalic in position  UA: 2+ LE, asymptomatic    1. Rule out labor: Pt experiencing contractions. BP: 132/61. CE: 1.5/70/-2.   - Observe in L and D  - Vital signs per unit  - EFH monitoring + toco  - Hydration    2. Late to Prenatal Care: At 25 weeks.        I have discussed the diagnosis with the patient and the intended plan as seen in the above orders. All questions were answered concerning future plans. I have discussed medication side effects and warnings with the patient as well. Labor precautions discussed, including: Regular painful contractions, lasting for greater than one hour, taking your breath away; any vaginal bleeding; any leakage of fluid; or absent or decreased fetal movement. Call M.D. on call if any of these symptoms or signs occur.       Patient discussed with Dr. Marsha Marcos MD  Family Medicine Resident

## 2020-10-16 NOTE — PROGRESS NOTES
1912: SBAR report received from [address] [place] [address],[address] Floor: RN at bedside. Pt appears very uncomfortable. 2nd IV bag hung for epidural bolus. 1926: Dr. Omkar Chatman notified pt is ready for epidural. MD will come evaluate patient. 1941: Dr. Connor Campos at bedside assessing pt for CSE placement. Pt assisted to the side of bed for procedure. RN assisting pt with positioning. 1952: Pt assisted to Right tilt position after CSE placement. Pt tolerated procedure well. EFM readjusted. 2130: Dr. Sheryle Copper at bedside to evaluate pt's progress in labor and perform SVE. SVE 10/100/0 station. Pt with no rectal pressure or urge to push. Verbal orders to labor down X 1 hour and then begin to push. 2242: Pt begins pushing at this time. RN and Dr. Augusta Ashraf remain at bedside continuously assessing FHR and progress of labor and fetal descent. 2330: Dr. Sheryle Copper arrives for delivery. 2345: Verbal orders from Dr. Sheryle Copper to bolus postpartum Pitocin at 999 ml/hr at this time     0205: Pt ambulated with steady gait to bathroom to void. Pt voided without difficulty. Pericare taught. Pt given clean ice pads and underware. Pt ambulated back to bed without incident. 0250: TRANSFER - OUT REPORT:    Verbal report given to BETI Major RN(name) on Lord Donovan  being transferred to MIU(unit) for routine progression of care       Report consisted of patients Situation, Background, Assessment and   Recommendations(SBAR). Information from the following report(s) SBAR, Kardex, Procedure Summary, Intake/Output and MAR was reviewed with the receiving nurse.     Lines:   Peripheral IV 10/16/20 Posterior;Right Hand (Active)   Site Assessment Clean, dry, & intact 10/16/20 1915   Phlebitis Assessment 0 10/16/20 1915   Infiltration Assessment 0 10/16/20 1915   Dressing Status Clean, dry, & intact 10/16/20 1915   Dressing Type Transparent;Tape 10/16/20 1915   Hub Color/Line Status Pink 10/16/20 1915   Action Taken Blood drawn 10/16/20 1630 Opportunity for questions and clarification was provided.       Patient transported with:   Registered Nurse

## 2020-10-16 NOTE — PROGRESS NOTES
1625: SBAR report received from [address] [place] [address]. Resident MD placing appropriate orders at this time. 1630: PIV established by this RN. Blood specimens obtained per order. 1732: Patient denies rectal pressure. Gripper socks applied. This RN disconnecting patient from EFM. Patient ambulatory to restroom with steady gait, accompanied by FOB. Bloody show noted on bed pad. 1740: SROM: clear, moderate. Kiara Every MD at bedside. Kiara Every MD shown previously observed bloody show on bed pad. 1742: SVE performed by Shahid Simpson MD: 5/90/0. Patient tolerated well. 1745: Patient reports desire for epidural after speaking with Shahid Walters MD in [address] [facility] about epidural. This RN previously educated patient on epidural placement using . This RN to begin appropriate LR bolus. 1803Oswaldo Julian OBT at bedside preparing delivery table. 1805Velvet Handler MD, Anesthesiologist, informed of patient's desire for epidural.    1912: Verbal shift change report given to Andrea Crawford RN (oncoming nurse) by Dixie Hanna RN (offgoing nurse). Report included the following information SBAR, MAR and Recent Results.

## 2020-10-16 NOTE — ED TRIAGE NOTES
C/o lower abdominal cramping  Q5 min that radiated to back accompanied by vomiting. 7 weeks 5 days pregnant. 1st pregnancy.

## 2020-10-16 NOTE — ANESTHESIA PREPROCEDURE EVALUATION
Relevant Problems   No relevant active problems       Anesthetic History   No history of anesthetic complications            Review of Systems / Medical History  Patient summary reviewed, nursing notes reviewed and pertinent labs reviewed    Pulmonary  Within defined limits                 Neuro/Psych   Within defined limits           Cardiovascular                  Exercise tolerance: >4 METS     GI/Hepatic/Renal  Within defined limits              Endo/Other  Within defined limits           Other Findings   Comments: G1 P)           Physical Exam    Airway             Cardiovascular               Dental    Dentition: Upper dentition intact and Lower dentition intact     Pulmonary                 Abdominal         Other Findings            Anesthetic Plan    ASA: 2  Anesthesia type: spinal and epidural            Anesthetic plan and risks discussed with: Patient      Informed consent obtained.

## 2020-10-16 NOTE — PROGRESS NOTES
Labor Progress Note  Patient seen, fetal heart rate and contraction pattern evaluated, patient examined. Patient Vitals for the past 1 hrs:   SpO2   10/16/20 1810 100 %   10/16/20 1806 90 %   10/16/20 1800 100 %       Physical Exam:  Cervical Exam:  Cervical Exam  Dilation (cm): 5  Eff: 90 %  Station: 0  Vaginal exam done by? : Ute Human MD  Membrane Status: SROM  Membranes:  Spontaneous Rupture of Membranes; Amniotic Fluid: clear fluid  Fetal Heart Rate: Reactive  Baseline: 150 per minute  Variability: moderate  Accelerations: yes  Decelerations: none  Uterine contractions: regular, every 2 minutes    Assessment/Plan:  Ms. Sabina Chaudhry is a 21 y.o. Ardean Cage female with an estimated gestational age of 37w6d who is Latent Phase of Labor. PNL: O +, RH pos, GBS Neg, Covid Neg. Antibody screen- neg, Rb immune, VZ immune, HBsAg titer- neg, RPR- neg, HIV- neg, pap smear- NILM, gonorrhea/chlamydia- neg, UCX mixed urogenital henna, 1 hr GTT wnl. Anatomy scan wnl, NIPT low risk. S/p TDap and Influenza. MFM US (10/5): EFW 18% (low normal range), normal amniotic fluid, cephalic in position. UA: 2+ LE, asymptomatic   1. Active phase of Labor: CE: 5/90/0 at 1740. Hg:10.9 Cr: 0.58 LD:159.  - Recheck in 4hrs  - Epidural to be placed  - MIVF  2. Late to Prenatal Care: At 25 weeks. Patient discussed with Dr. La Nena Borrego.   Colette Andrade MD  Family Medicine Resident

## 2020-10-17 PROCEDURE — 0UQMXZZ REPAIR VULVA, EXTERNAL APPROACH: ICD-10-PCS | Performed by: STUDENT IN AN ORGANIZED HEALTH CARE EDUCATION/TRAINING PROGRAM

## 2020-10-17 PROCEDURE — 77030021125

## 2020-10-17 PROCEDURE — 74011250637 HC RX REV CODE- 250/637: Performed by: STUDENT IN AN ORGANIZED HEALTH CARE EDUCATION/TRAINING PROGRAM

## 2020-10-17 PROCEDURE — 0HQ9XZZ REPAIR PERINEUM SKIN, EXTERNAL APPROACH: ICD-10-PCS | Performed by: STUDENT IN AN ORGANIZED HEALTH CARE EDUCATION/TRAINING PROGRAM

## 2020-10-17 PROCEDURE — 65270000029 HC RM PRIVATE

## 2020-10-17 RX ORDER — IBUPROFEN 800 MG/1
800 TABLET ORAL EVERY 8 HOURS
Status: DISCONTINUED | OUTPATIENT
Start: 2020-10-17 | End: 2020-10-18 | Stop reason: HOSPADM

## 2020-10-17 RX ORDER — SODIUM CHLORIDE 0.9 % (FLUSH) 0.9 %
5-40 SYRINGE (ML) INJECTION EVERY 8 HOURS
Status: DISCONTINUED | OUTPATIENT
Start: 2020-10-17 | End: 2020-10-17

## 2020-10-17 RX ORDER — HYDROCORTISONE ACETATE PRAMOXINE HCL 2.5; 1 G/100G; G/100G
CREAM TOPICAL AS NEEDED
Status: DISCONTINUED | OUTPATIENT
Start: 2020-10-17 | End: 2020-10-18 | Stop reason: HOSPADM

## 2020-10-17 RX ORDER — OXYTOCIN/RINGER'S LACTATE 30/500 ML
10 PLASTIC BAG, INJECTION (ML) INTRAVENOUS AS NEEDED
Status: DISCONTINUED | OUTPATIENT
Start: 2020-10-17 | End: 2020-10-18 | Stop reason: HOSPADM

## 2020-10-17 RX ORDER — OXYTOCIN/RINGER'S LACTATE 30/500 ML
95 PLASTIC BAG, INJECTION (ML) INTRAVENOUS AS NEEDED
Status: DISCONTINUED | OUTPATIENT
Start: 2020-10-17 | End: 2020-10-18 | Stop reason: HOSPADM

## 2020-10-17 RX ORDER — SODIUM CHLORIDE 0.9 % (FLUSH) 0.9 %
5-40 SYRINGE (ML) INJECTION AS NEEDED
Status: DISCONTINUED | OUTPATIENT
Start: 2020-10-17 | End: 2020-10-17

## 2020-10-17 RX ORDER — DOCUSATE SODIUM 100 MG/1
100 CAPSULE, LIQUID FILLED ORAL 2 TIMES DAILY
Status: DISCONTINUED | OUTPATIENT
Start: 2020-10-17 | End: 2020-10-18 | Stop reason: HOSPADM

## 2020-10-17 RX ORDER — OXYCODONE AND ACETAMINOPHEN 5; 325 MG/1; MG/1
1 TABLET ORAL
Status: DISCONTINUED | OUTPATIENT
Start: 2020-10-17 | End: 2020-10-18 | Stop reason: HOSPADM

## 2020-10-17 RX ADMIN — IBUPROFEN 800 MG: 800 TABLET ORAL at 08:40

## 2020-10-17 RX ADMIN — DOCUSATE SODIUM 100 MG: 100 CAPSULE, LIQUID FILLED ORAL at 08:40

## 2020-10-17 RX ADMIN — DOCUSATE SODIUM 100 MG: 100 CAPSULE, LIQUID FILLED ORAL at 18:29

## 2020-10-17 RX ADMIN — IBUPROFEN 800 MG: 800 TABLET ORAL at 00:56

## 2020-10-17 NOTE — LACTATION NOTE
This note was copied from a baby's chart. Discussed with mother her plan for feeding. Reviewed the benefits of exclusive breast milk feeding during the hospital stay. Informed her of the risks of using formula to supplement in the first few days of life as well as the benefits of successful breast milk feeding; referred her to the Breastfeeding booklet about this information. She acknowledges understanding of information reviewed and states that it is her plan to both breast and formula feed her infant. Will support her choice and offer additional information as needed. Pt will successfully establish breastfeeding by feeding in response to early feeding cues   or wake every 3h, will obtain deep latch, and will keep log of feedings/output. Taught to BF at hunger cues and or q 2-3 hrs and to offer 10-20 drops of hand expressed colostrum at any non-feeds. Breast Assessment  Left Breast: Medium  Left Nipple: Everted, Intact  Right Breast: Medium  Right Nipple: Everted, Intact  Breast- Feeding Assessment  Attends Breast-Feeding Classes: No  Breast-Feeding Experience: No  Breast Trauma/Surgery: No  Type/Quality: Good  Lactation Consultant Visits  Breast-Feedings: Good   Mother/Infant Observation  Mother Observation: Alignment, Breast comfortable, Close hold  Infant Observation: Latches nipple and aereolae, Lips flanged, lower, Lips flanged, upper, Opens mouth  LATCH Documentation  Latch: Grasps breast, tongue down, lips flanged, rhythmic sucking  Audible Swallowing: A few with stimulation  Type of Nipple: Everted (after stimulation)  Comfort (Breast/Nipple): Soft/non-tender  Hold (Positioning): Full assist, teach one side, mother does other, staff holds  LATCH Score: 8  Hand Expression Education:  Mom taught how to manually hand express her colostrum. Emphasized the importance of providing infant with valuable colostrum as infant rests skin to skin at breast.  Aware to avoid extended periods of non-feeding. Aware to offer 10-20+ drops of colostrum every 2-3 hours until infant is latching and nursing effectively. Taught the rationale behind this low tech but highly effective evidence based practice.

## 2020-10-17 NOTE — L&D DELIVERY NOTE
Delivery Summary    Patient: Melania Kumar MRN: [id number]  SSN: [id number]    YOB: 2000  Age: 21 y.o. Sex: female       Information for the patient's :  Zoraida Castelan, Female Sangita Peraltar [[id number]]       Labor Events:    Labor: No    Steroids: None   Cervical Ripening Date/Time:       Cervical Ripening Type: None   Antibiotics During Labor: No   Rupture Identifier:      Rupture Date/Time: 10/16/2020 5:40 PM   Rupture Type: SROM   Amniotic Fluid Volume: Moderate    Amniotic Fluid Description: Clear    Amniotic Fluid Odor: None    Induction: None       Induction Date/Time:        Indications for Induction:      Augmentation: None   Augmentation Date/Time:      Indications for Augmentation:     Labor complications: None       Additional complications:        Delivery Events:  Indications For Episiotomy:     Episiotomy: None   Perineal Laceration(s): 1st   Repaired: Yes   Periurethral Laceration Location: bilateral    Repaired: Yes   Labial Laceration Location:     Repaired:     Sulcal Laceration Location:     Repaired:     Vaginal Laceration Location:     Repaired:     Cervical Laceration Location:     Repaired:     Repair Suture: Vicryl 3-0; Other (See Note)   Number of Repair Packets: 2   Estimated Blood Loss (ml):  ml   Quantitative Blood Loss (ml)                Delivery Date: 10/16/2020    Delivery Time: 11:37 PM  Delivery Type: Vaginal, Spontaneous  Sex:  Female    Gestational Age: 37w6d   Delivery Clinician:  Evelia Monte  Living Status: Living   Delivery Location: L&D Room 210          APGARS  One minute Five minutes Ten minutes   Skin color: 1   1        Heart rate: 2   2        Grimace: 2   2        Muscle tone: 2   2        Breathin   2        Totals: 9   9            Presentation: Vertex    Position:        Resuscitation Method:  Suctioning-bulb; Tactile Stimulation     Meconium Stained: None      Cord Information: 3 Vessels  Complications: None  Cord around: Delayed cord clamping? Yes  Cord clamped date/time:10/16/2020 11:38 PM  Disposition of Cord Blood: Lab    Blood Gases Sent?: No    Placenta:  Date/Time:    Removal:        Appearance:        Measurements:  Birth Weight:        Birth Length:        Head Circumference:        Chest Circumference:       Abdominal Girth: Other Providers:   Jimena YUAN;RADHA MULTANI;DAYAMI JUAREZ;;;;;;;REMEDIOS CALERO;LENY VICENTE;REMEDIOS MARINA;SAM ALCANTAR, Obstetrician;Primary Nurse;Primary  Nurse;Nicu Nurse;Neonatologist;Anesthesiologist;Crna;Nurse Practitioner;Midwife;Nursery Nurse;Resident; Charge Nurse;Resident           Group B Strep:   Lab Results   Component Value Date/Time    GrBStrep, External Negative 10/12/2020     Information for the patient's :  Erickson Lyons, Female Marguerite Mcgowan [[id number]]   No results found for: ABORH, PCTABR, PCTDIG, BILI, ABORHEXT, ABORH     No results for input(s): PCO2CB, PO2CB, HCO3I, SO2I, IBD, PTEMPI, SPECTI, PHICB, ISITE, IDEV, IALLEN in the last 72 hours.

## 2020-10-17 NOTE — DISCHARGE SUMMARY
Obstetrical Discharge Summary     Name: Husam Ireland MRN: [id number]  SSN: [id number]    YOB: 2000  Age: 21 y.o. Sex: female      Admit Date: 10/16/2020    Discharge Date: 10/18/2020     Admitting Physician: Andra Aden MD     Attending Physician:  Omi Vela DO     Admission Diagnoses: Pregnancy [Z34.90]    Discharge Diagnoses:   Information for the patient's :  Abigail Mendes, Female Gladies Space [[id number]]   Delivery of a 2.89 kg female infant via Vaginal, Spontaneous on 10/16/2020 at 11:37 PM  by Radha Barnett. Apgars were 9  and 9 . Additional Diagnoses:   Hospital Problems  Date Reviewed: 10/12/2020          Codes Class Noted POA    Pregnancy ICD-10-CM: Z34.90  ICD-9-CM: V22.2  10/16/2020 Unknown             Lab Results   Component Value Date/Time    Rubella, External Immune 2020    GrBStrep, External Negative 10/12/2020       Immunization(s):   Immunization History   Administered Date(s) Administered    Influenza Vaccine [facility]) PF (>6 Mo Flulaval, Fluarix, and >3 Tomasa Males 31192) 2020    Tdap 2020        Hospital Course:   Patient is a 21 y.o.  s/p  at 37 weeks 5 days. Presented for  Active Labor in setting of mildly elevated blood pressures. Pregnancy complicated by late to prenatal care at 25 weeks. Labor was complicated by bilateral 1st degree perineal lacerations which were repaired with 3-0 vicryl. Delivered TLFI by  without complications. Normal hospital course following the delivery. On day of discharge patient reported minimal lochia, well controlled pain. Pt denies fever, chills, HA, vision disturbances, RUQ pain, chest pain, SOB, N/V, urinary problems. Discharged with pain regimen and bowel regimen. Advised to continue prenatal vitamins. EPDS Score: 0.      Follow up test at discharge: None  Condition at Discharge: Stable   Disposition: Discharge to Home    Physical exam:  Visit Vitals  /65 (BP 1 Location: Right arm, BP Patient Position: At rest)   Pulse 71   Temp 98.5 °F (36.9 °C)   Resp 16   Ht 5' 4\" (1.626 m)   Wt 54.9 kg (121 lb)   LMP 01/26/2020   SpO2 96%   Breastfeeding Unknown   BMI 20.77 kg/m²       Exam:  Patient without distress. CTAB, no w/r/r/c               RRR, + S1 and S2, no m/r/g               Abdomen soft, fundus firm at level of umbilicus, non tender               Perineum with normal lochia noted. Lower extremities are negative for swelling, cords or tenderness. Patient Instructions:   Current Discharge Medication List      START taking these medications    Details   docusate sodium (COLACE) 100 mg capsule Take 1 Cap by mouth two (2) times a day for 60 days. Qty: 60 Cap, Refills: 0      ibuprofen (MOTRIN) 800 mg tablet Take 1 Tab by mouth every eight (8) hours as needed for Pain for up to 20 doses. Indications: pain  Qty: 20 Tab, Refills: 0      ferrous sulfate (Iron, Ferrous Sulfate,) 325 mg (65 mg iron) tablet Take 1 Tab by mouth two (2) times a day for 60 doses. Qty: 60 Tab, Refills: 1         CONTINUE these medications which have NOT CHANGED    Details   prenatal vit/iron fum/folic ac (RIGHT STEP PRENATAL VITAMINS PO) Take  by mouth. Will schedule follow up appointments for mother and baby girl tomorrow morning with [facility]. Will call to notify patient about it.    Pt new/additional phone number: [phone]    Pt discussed with Dr. Lianne Ramirez [facility] Attending)    Signed By:  Heather Shay MD    Family Medicine Resident

## 2020-10-17 NOTE — ROUTINE PROCESS
Initial admission assessment completed discussed plan of care, safety and how to notify nursing of emergent situations via [email or web address]  #[id number]. Patient has no further questions or concerns at this time.

## 2020-10-17 NOTE — PROGRESS NOTES
Labor Progress Note  Patient seen, fetal heart rate and contraction pattern evaluated, patient examined. Patient is doing well. Epidural in place. SVE C/C/0 done by Dr. Lilibeth Hoover at 9:30pm. Is starting to feel some pelvice pressure. Allowing patient to labor down until 10:30pm.   Patient Vitals for the past 1 hrs:   Temp   10/16/20 2130 98.4 °F (36.9 °C)       Physical Exam:  Cervical Exam:  Cervical Exam  Dilation (cm): 10  Eff: 100 %  Station: 0  Vaginal exam done by? : Dr. David Wynne Status: SROM  Membranes:  Spontaneous Rupture of Membranes; Amniotic Fluid: clear fluid  Uterine Activity: Frequency: Every 2 minutes, Duration: 45 seconds and Intensity: moderate  Fetal Heart Rate: Reactive  Baseline: 150 per minute  Variability: moderate  Accelerations: yes  Decelerations: none  Uterine contractions: regular, every 2 minutes    Assessment/Plan:  Ms. Xavi Albarado is a 20 y.o.   female with an estimated gestational age of 37w6d who is Active Phase of Labor. PNL: O +, RH pos, GBS Neg, Covid Neg. Antibody screen- neg, Rb immune, VZ immune, HBsAg titer- neg, RPR- neg, HIV- neg, pap smear- NILM, gonorrhea/chlamydia- neg, UCX mixed urogenital henna, 1 hr GTT wnl. Anatomy scan wnl, NIPT low risk. S/p TDap and Influenza.  MFM US (10/5): EFW 18% (low normal range), normal amniotic fluid, cephalic in position. UA: 2+ LE, asymptomatic   1. Active phase of Labor: S/p epidural. CE: C/C/0 at 2130. Hg:10.9 Cr: 0.58 LD:159.  - Allowing patient to labor down until 10:30pm. Will start pushing earlier if she has increased pelvic pressure.   - Upcr pending  - MIVF  2. Late to Prenatal Care: At 25 weeks.   Patient Discussed with Dr. Nicholas Yuan MD  Family Medicine Resident

## 2020-10-17 NOTE — PROGRESS NOTES
Pt seen and examined. Received epidural , Not feeling pressure. AVSS  SVE: C/100/0  FHT: 140 reactive, Cat I  TOco: couplet Q2-3       A/ G1 just received epidural and numb. Now complete.     P/  Wait until feeling some pressure and begin pushing

## 2020-10-17 NOTE — ROUTINE PROCESS
Bedside and Verbal shift change report given to SADAF Reyes (oncoming nurse) by Darnell Giordano. Radha Damon RN (offgoing nurse). Report included the following information SBAR, Kardex, Intake/Output, MAR and Recent Results.

## 2020-10-17 NOTE — PROGRESS NOTES
[facility] [address]   Office [phone], Fax [phone]                                Post-Partum Day Number 1 Progress Note    Patient doing well post-partum without significant complaint. Pain well controlled. Lochia mild. Tolerating diet. Ambulating. Voiding without difficulty. Patient reports passing flatus but has not yet had a BM. She denies any fever, chills, CP, SOB, headache, vision changes, dizziness, leg pain, leg swelling. Vitals:    Patient Vitals for the past 8 hrs:   BP Temp Pulse Resp SpO2   10/17/20 0308 121/61 98.8 °F (37.1 °C) 81 17    10/17/20 0158 (!) 132/57  (!) 110     10/17/20 0128 (!) 124/59  93     10/17/20 0051 117/62  83     10/17/20 0036 (!) 125/59  83     10/17/20 0022 123/62  (!) 102     10/17/20 0005 118/64 98.8 °F (37.1 °C) (!) 107 16    10/16/20 2310 139/88  90  96 %   10/16/20 2256 138/64  84  (!) 89 %     Temp (24hrs), Av.7 °F (37.1 °C), Min:98.4 °F (36.9 °C), Max:99 °F (37.2 °C)      Exam:  Patient without distress. CTAB, no w/r/r.               RRR, +S1 and S2, no m/r/g. Abdomen soft, fundus firm below level of umbilicus, nontender. Perineum with normal lochia noted. Lower extremities:  No edema. No palpable cords or tenderness. Lab/Data Review:  Recent Results (from the past 12 hour(s))   PROTEIN/CREATININE RATIO, URINE    Collection Time: 10/16/20  9:26 PM   Result Value Ref Range    Protein, urine random 26 (H) 0.0 - 11.9 mg/dL    Creatinine, urine 90.00 mg/dL    Protein/Creat. urine Ratio 0.3           Assessment and Plan:    Corinne Rosado is a 21 y.o.  s/p  at 37 weeks 5 days. Pregnancy was complicated by late to prenatal care at 25 weeks. Patient appears to be having uncomplicated post-partum course. 1. Continue routine perineal care and maternal education. 2. Elevated blood pressure: BP max /4 on admission.  [address] [facility] labs checked and U PrCr ratio elevated at  0.3. , ALT/AST wnl, Cr 0.58.    - Continue monitoring BP   3. Pain regimen: Motrin 800 mg q8h, Percocet 5-325 mg q6h PRN moderate pain   4. Bowel regimen: Colace 100 mg BID  5. Baby girl is breastfeeding. Mother plans to have her follow up with [facility]. 6. Plan discharge tomorrow if no problems occur. Patient [facility]).                    Dalton Soto DO  Family Medicine Resident

## 2020-10-17 NOTE — ANESTHESIA PROCEDURE NOTES
CSE Block    Start time: 10/16/2020 7:45 PM  End time: 10/16/2020 7:59 PM  Performed by: Keisha Keith DO  Authorized by: Keisha Keith DO     Pre-Procedure  Indications: procedure for pain    preanesthetic checklist: patient identified, risks and benefits discussed, anesthesia consent, patient being monitored and timeout performed    Timeout Time: 19:45        Procedure:   Patient Position:  Seated  Prep Region:  Lumbar  Prep: Betadine    Location:  L3-4    Epidural Needle:   Needle Type:  Deacon Gaudier  Needle Gauge:  18 G  Injection Technique:  Loss of resistance using air  Attempts:  1    Spinal Needle:   Needle Type:   Ruthy  Needle Gauge:  27 G    Catheter:   Catheter Type:  Standard  Catheter Size:  20 G  Catheter at Skin Depth (cm):  7.5  Depth in Epidural Space (cm):  2.5  Events: no blood with aspiration, no cerebrospinal fluid with aspiration, no paresthesia and negative aspiration test    Test Dose:  Negative    Assessment:   Catheter Secured:  Tegaderm and tape  Insertion:  Uncomplicated  Patient tolerance:  Patient tolerated the procedure well with no immediate complications

## 2020-10-18 VITALS
BODY MASS INDEX: 20.66 KG/M2 | OXYGEN SATURATION: 96 % | DIASTOLIC BLOOD PRESSURE: 70 MMHG | HEIGHT: 64 IN | HEART RATE: 82 BPM | TEMPERATURE: 98.3 F | RESPIRATION RATE: 16 BRPM | WEIGHT: 121 LBS | SYSTOLIC BLOOD PRESSURE: 111 MMHG

## 2020-10-18 PROCEDURE — 74011250637 HC RX REV CODE- 250/637: Performed by: STUDENT IN AN ORGANIZED HEALTH CARE EDUCATION/TRAINING PROGRAM

## 2020-10-18 RX ORDER — IBUPROFEN 800 MG/1
800 TABLET ORAL
Qty: 20 TAB | Refills: 0 | Status: SHIPPED | OUTPATIENT
Start: 2020-10-18

## 2020-10-18 RX ORDER — LANOLIN ALCOHOL/MO/W.PET/CERES
325 CREAM (GRAM) TOPICAL 2 TIMES DAILY
Qty: 60 TAB | Refills: 1 | Status: SHIPPED | OUTPATIENT
Start: 2020-10-18 | End: 2020-11-17

## 2020-10-18 RX ORDER — DOCUSATE SODIUM 100 MG/1
100 CAPSULE, LIQUID FILLED ORAL 2 TIMES DAILY
Qty: 60 CAP | Refills: 0 | Status: SHIPPED | OUTPATIENT
Start: 2020-10-18 | End: 2020-12-17

## 2020-10-18 RX ADMIN — DOCUSATE SODIUM 100 MG: 100 CAPSULE, LIQUID FILLED ORAL at 09:26

## 2020-10-18 RX ADMIN — IBUPROFEN 800 MG: 800 TABLET ORAL at 09:26

## 2020-10-18 NOTE — DISCHARGE INSTRUCTIONS
Patient Discharge Instructions    [facility] / [id number] : 2000    Admitted 10/16/2020 Discharged: 10/18/2020       Por favor tenga luis fernando documento presente en thao ishaan de seguimiento con thao médico primario. Primary care provider:  Mik Steele DO    Discharging provider:  Zulma Fernando MD  - Family Medicine Resident  Suni Howard MD -  OBGYN attending          Diágnostico de Admisión:  Pregnancy [Z34.90]      RECOMENDACIONES DE CUIDADO:     VAMOS A PROGRAMAR ISHAAN DE SEGUIMIENTO PARA MAMA (6 semanas con Dr. Avril Lincoln) Louise Stefan, [address] DE THAO ISHAAN. Continue Tratamiento:  - Por favor continue Motrin 800 mg, 1 tableta cada 8 horas por los próximos 2 días, luego 1 tableta cada 8 horas mientras sea necesario para el dolor.  - Por favor continue Colace 100 mg para el estreñimiento, tome 1 tableta dos veces al día hasta que pueda defercar regularmente sin necesidad del medicamento. - Por favor comience Ferrous Sulfate 325 mg para la anemia, [place] 1 tableta 2 veces día con jugo de naranja. - Por favor continue tomando zeina vitaminas prenatales. Pruebas de seguimiento que necesita: Ninguna    Resultados pendientes:   En el momento de thao de cole los [place] de las siguientes pruebas están pendiente: [place]. Por favor discuta estos resultados con thao proveedor primario en thao ishaan de seguimiento. Importante que [employer] siguientes síntomas: dolor de [place], falta de [place], [place], escalofríos, náusea, vómito, diarrea, cambios en thao estado mental, caídas, debilidad y sangrado. DIETA/que comer: Regular. ACTIVIDAD FÍSICA:   Instrucciones de [facility]ica    No levante nada que sea más pesado que thao bebé por las próximas 6 semanas. No insertar nada por la vaginal por 6 semanas. Luego del parto por cesárea/ vaginal debe evitar tener relaciones sexuales por 6 semanas. Tendrá thao ishaan de seguimiento posparto a las 6 semanas. Puede manejar godfrey vehículo mientras no esté tomando Percocet ni ningún medicamento nárcotico (Motrin está gibson). Cuidado de Her[place]:  llene el lester bottle con agua tibia y exprima hasta que salga un chorro de agua por la boquilla para ayudarle a limpiar el área perineal.      Entiendo que si surge algún problema cuando llegue a mi hogar puedo contactar a mi médico.    Me miller explicado las siguientes instrucciones y miller aclarado mis dudas. Entiendo y reconozco las instrucciones brindadas. Firma de médico / Deri Alanis de paciente ó representante                                                         Fecha/Hora    Patient Education        Godfrey recién nacido en el hogar: Instrucciones de cuidado  Your  at Home: Care Instructions  Instrucciones de [address] primeras semanas de kobe de godfrye bebé, usted pasará la mayor parte del tiempo alimentándolo, cambiándole los pañales y reconfortándolo. A veces podría sentirse abrumado(a). Es natural que se pregunte si está haciendo lo correcto, especialmente al ser padres primerizos. El cuidado de los recién nacidos resulta más fácil con el correr de [place]. Pronto conocerá el significado de cada llanto y podrá entender qué es lo que godfrey bebé necesita o desea. La atención de seguimiento es alejandra parte clave del tratamiento y la seguridad de godfrey hijo. Asegúrese de hacer y acudir a todas las citas, y llame a godfrey médico si godfrey hijo está teniendo problemas. También es alejandra buena idea saber los resultados de los exámenes de godfrey hijo y mantener alejandra lista de los medicamentos que natasha.   ¿Cómo puede cuidar a ogdfrey hijo en el hogar? Alimentación  · Alimente a sauceda bebé cuando luis fernando lo pida. [place] significa que debería amamantarlo o alimentarlo con biberón cuando el bebé parece [facility]. No establezca horarios. · Ry las primeras 2 semanas, sauceda bebé tomará el pecho al menos 8 veces en un período de 24 horas. Los bebés alimentados con leche de fórmula podrían necesitar menos uriel, al menos 6 cada 24 horas. · Las primeras uriel suelen ser Leelee Sable. A veces, un recién nacido recibe [employer] o del biberón solo ry pocos minutos. Las uriel se prolongarán gradualmente. · Es posible que deba despertar a sauceda bebé para alimentarlo ry los primeros días posteriores al nacimiento. Sueño  · Siempre debe hacer dormir al bebé boca arriba (sobre la espalda) y no boca abajo (sobre el BJURHOLM). Daniel Ramses, se reduce el riesgo del síndrome de muerte súbita infantil (SIDS, por zeina siglas en inglés). · La mayoría de los bebés duermen un total de 18 horas al día. Se despiertan por poco tiempo, katherine mínimo, cada 2 o 3 horas. · Los recién nacidos tienen algunos momentos de sueño [place]. El bebé puede hacer ruidos o parecer inquieto. [place] ocurre aproximadamente a intervalos de 50 a 60 minutos y, por lo general, dura unos pocos minutos. · Al principio, el bebé puede dormir a pesar de los ruidos allyssa. Posteriormente, los ruidos podrían despertarlo. · Cuando el recién nacido se despierta, suele tener hambre y necesita que lo alimenten. Cambio de pañales y hábitos intestinales  · Trate de revisar el pañal de sauceda bebé katherine mínimo cada 2 horas. Si es necesario cambiarlo, hágalo lo antes posible. [place] ayudará a prevenir la dermatitis de pañal.  · Los pañales mojados o sucios de sauceda recién nacido pueden darle pistas acerca de la mickey de sauceda bebé. Los bebés pueden deshidratarse si no reciben suficiente [employer] o de fórmula o si pierden líquido a causa de diarrea, vómitos o fiebre.   · Ry los primeros días de kobe, es posible que el bebé tenga unos 3 pañales mojados al día. Más adelante, usted puede esperar 6 o más pañales mojados al día ry el primer mes de kobe. Puede ser difícil advertir si un pañal está mojado cuando utiliza pañales desechables. Si no logra darse cuenta, coloque un pañuelo de papel en el pañal. Farnaz se mojará cuando sauceda bebé orine. · Lleve un registro de qué hábitos de evacuación son normales o habituales para sauceda hijo. Cuidado del cordón umbilical  · Mantenga el pañal de sauceda bebé doblado debajo del muñón umbilical. Si eso no funciona gibson, antes de ponerle el pañal a sauceda bebé, recorte un área pequeña cerca de la parte superior del pañal para que el cordón quede al aire. · Para mantener el cordón seco, jay a sauceda bebé un baño de esponja en vez de bañar a sauceda bebé en alejandra sandip o un lavabo. El muñón umbilical debería caerse al cabo de alejandra semana o [place]. ¿Cuándo debe pedir ayuda? Llame al médico de sauceda bebé ahora mismo o busque atención médica inmediata si:    · Sauceda bebé tiene alejandra temperatura rectal inferior a 97.5°F (36.4°C) o de 100.4°F (38°C) o más. Llame si no puede tomarle la temperatura leandro el bebé parece estar caliente.     · Sauceda bebé no moja pañales por un período de 6 horas.     · La piel del bebé o la parte romulo de zeina ojos adquiere un color amarillento más brillante o intenso.     · Observa pus o piel enrojecida en la juanita del muñón del cordón umbilical o alrededor de él. Estas son señales de infección. Preste especial atención a los [employer] mickey de sauceda hijo y asegúrese de comunicarse con sauceda médico si:    · Sauceda bebé no tiene evacuaciones del intestino regulares de acuerdo con sauceda edad.     · Sauceda bebé llora de forma inusual o por un período de tiempo fuera de lo normal.     · Sauceda bebé está despierto Verdia Yan y no se despierta para alimentarse, está muy inquieto, parece demasiado cansado para comer o no tiene interés en comer. ¿Dónde puede encontrar más información en inglés?   Simona Chance a [email or web address]  Abiodun FRITZ en la búsqueda para aprender más acerca de \"Godfrey recién nacido en el hogar: Instrucciones de cuidado. \"  Revisado: 27 mayo, 2020               Versión del contenido: 12.6  © 2006-2020 Healthwise, Incorporated. Las instrucciones de cuidado fueron adaptadas bajo licencia por [employer] Help Connections (which disclaims liability or warranty for this information). Si usted tiene [place] afección médica o sobre estas instrucciones, siempre pregunte a godfrey profesional de mickey. Healthwise, Incorporated niega toda garantía o responsabilidad por godfrey uso de esta información. Patient Education     El período posparto: Instrucciones de cuidado-Instrucciones de cuidado  Postpartum: Care Instructions  Instrucciones de cuidado  Después del parto (período posparto), godfrey cuerpo pasa por muchos cambios. Algunos de estos cambios suceden ry varias semanas. [address], el cuerpo comienza a recuperarse y se prepara para el amamantamiento. Es posible que [address] se sienta sensible. Las hormonas pueden cambiar godfrey estado de ánimo sin advertencia y sin motivo aparente. Ry las primeras [address] después del parto, muchas mujeres tienen emociones que [place] de mar a ejsse. Es posible que le resulte difícil dormir. Es posible que llore mucho. [place] se llama \"melancolía de la maternidad\". Estas emociones abrumadoras suelen desaparecer dentro de unos días o semanas. Mic es importante hablar con godfrey médico acerca de zeina sentimientos. Ry las primeras semanas después del [place], es fácil cansarse demasiado o sentirse [place]. No luciano demasiados esfuerzos. Descanse cada vez que pueda, acepte la ayuda de los demás, coma gibson y maicol abundantes líquidos.   En el primer par de Dwayne Gao de armando a nita, es posible que godfrey médico o partera deseen verla y hacer un plan para cualquier atención de seguimiento que usted pueda necesitar. Probablemente tendrá Candis Mater zhanna completa de posparto dentro de los primeros 3 meses después del [place]. En selvin momento, sauceda médico o partera revisarán sauceda recuperación del parto. Él o ayanna también verán cómo está enfrentando usted zeina emociones y conversarán sobre zeina inquietudes y preguntas. La atención de seguimiento es alejandra parte clave de sauceda tratamiento y seguridad. Asegúrese de hacer y acudir a todas las citas, y llame a sauceda médico si está teniendo problemas. También es alejandra buena idea saber los resultados de zeina exámenes y mantener alejandra lista de los medicamentos que natasha. ¿Cómo puede cuidarse en el hogar? · Duerma o descanse cuando sauceda bebé lo luciano. · Si es posible, permita que zeina familiares o zeina amigos la ayuden con las tareas del hogar. No intente hacerlo todo usted vicky. · Si tiene hemorroides o hinchazón o dolor alrededor de la abertura de la vagina, pruebe aplicarse frío y calor. Puede aplicarse hielo o alejandra compresa fría en la juanita ry 10 a 20 minutos cada vez. Póngase un paño glez entre el hielo y la piel. Además, trate de sentarse en algunos centímetros de agua tibia (baño de asiento) 3 veces al día y después de las evacuaciones. · [employer] analgésicos (medicamentos para el dolor) exactamente según las indicaciones. ? Si el médico le recetó un analgésico, tómelo según las indicaciones. ? Si no está tomando un analgésico recetado, pregúntele a sauceda médico si puede kellee macie de [employer]. · Coma más fibra para evitar el estreñimiento. Incluya alimentos katherine panes y cereales integrales, verduras crudas, frutas crudas y secas, y frijoles (habichuelas). · Alondra abundantes líquidos, los suficientes katherine para que sauceda orina sea de color amarillo keegan o transparente katherine el agua. Si tiene [employer], del corazón o del hígado y tiene que [place]'s líquidos, hable con sauceda médico antes de aumentar sauceda consumo.   · No se lave el interior de la vagina con líquidos (lavados vaginales). · Si tiene puntos de sutura, mantenga la juanita limpia con agua tibia, ya sea echándola o rociándola sobre la juanita externa de la vagina y el ano después de usar el baño. · Daphnie alejandra lista de preguntas para hacerle a sauceda médico o partera. Miri preguntas podrían ser sobre lo siguiente:  ? [employer] senos, katherine bultos o sensibilidad. ? Cuándo esperar que regrese suaceda período menstrual.  ? Qué forma de anticoncepción es la más adecuada para usted. ? El peso que aumentó ry el Cheri Freddie. ? Las opciones de [address]. ? [place] y bebidas son mejores para usted, sobre todo si está amamantando. ? Problemas que podría tener con la lactancia. ? Cuándo puede [employer]. Algunas mujeres maddie vez quieran hablar sobre lubricantes vaginales. ? Cualquier sentimiento de tristeza o inquietud que tenga. ¿Cuándo debe pedir ayuda? Llame al 911 en cualquier momento que considere que necesita atención de [place]. Por ejemplo, llame si:    · Tiene pensamientos de lastimarse o de hacerle daño a sauceda bebé o a otra persona.     · Se desmayó (perdió el conocimiento).     · Tiene dolor en el pecho, le falta el aire o tose angel.     · Tiene convulsiones. Llame a sauceda médico ahora mismo o busque atención médica de inmediato si:    · Sauceda sangrado vaginal parece hacerse más abundante.     · Se siente mareada o aturdida, o que está a punto de desmayarse.     · Tiene fiebre.     · Tiene dolor abdominal nuevo o más intenso.     · Tiene síntomas de un coágulo de angel en la pierna (que se llama trombosis venosa profunda), katherine:  ? Dolor en la pantorrilla, el muslo, la mitch o detrás de la rodilla. ? Enrojecimiento e hinchazón en la pierna o la mitch.     · Tiene señales de preeclampsia, katherine:  ? Hinchazón repentina de la gabriele, las brian o los pies. ? Nuevos problemas de la vista (katherine oscurecimiento, flako borroso o flako puntos). ? Dolor de juwan intenso.    Preste especial atención a los [facility] y asegúrese de comunicarse con sauceda médico si:    · Tiene nuevo flujo vaginal o luis fernando empeora.     · Se siente jesse o deprimida.     · Tiene problemas con los senos o el amamantamiento. ¿Dónde puede encontrar más información en inglés? Vaya a [email or web address]  Abiodun [id number] en la búsqueda para aprender más acerca de \"El período posparto: Instrucciones de cuidado-Instrucciones de cuidado. \"  Revisado: 2020               Versión del contenido: 12.6  © 1162-2214 Healthwise, Incorporated. Las instrucciones de cuidado fueron adaptadas bajo licencia por Good Help Connections (which disclaims liability or warranty for this information). Si usted tiene [place] afección médica o sobre estas instrucciones, siempre pregunte a sauceda profesional de mickey. Healthwise, Incorporated niega toda garantía o responsabilidad por sauceda uso de esta información. Patient Education        Sauceda recién nacido en el hogar: Instrucciones de cuidado  Your  at Home: Care Instructions  Instrucciones de [address] primeras semanas de kobe de sauceda bebé, usted pasará la mayor parte del tiempo alimentándolo, cambiándole los pañales y reconfortándolo. A veces podría sentirse abrumado(a). Es natural que se pregunte si está haciendo lo correcto, especialmente al ser padres primerizos. El cuidado de los recién nacidos resulta más fácil con el correr de [place]. Pronto conocerá el significado de cada llanto y podrá entender qué es lo que sauceda bebé necesita o desea. La atención de seguimiento es alejandra parte clave del tratamiento y la seguridad de sauceda hijo. Asegúrese de hacer y acudir a todas las citas, y llame a sauceda médico si sauceda hijo está teniendo problemas. También es alejandra buena idea saber los resultados de los exámenes de sauceda hijo y mantener alejandra lista de los medicamentos que natasha. ¿Cómo puede cuidar a sauceda hijo en el hogar? Alimentación  · Alimente a sauceda bebé cuando luis fernando lo pida.  [place] significa que debería amamantarlo o alimentarlo con biberón cuando el bebé parece [facility]. No establezca horarios. · Ry las primeras 2 semanas, sauceda bebé tomará el pecho al menos 8 veces en un período de 24 horas. Los bebés alimentados con leche de fórmula podrían necesitar menos uriel, al menos 6 cada 24 horas. · Las primeras uriel suelen ser Mino Staggers. A veces, un recién nacido recibe [employer] o del biberón solo ry pocos minutos. Las uriel se prolongarán gradualmente. · Es posible que deba despertar a sauceda bebé para alimentarlo ry los primeros días posteriores al nacimiento. Sueño  · Siempre debe hacer dormir al bebé boca arriba (sobre la espalda) y no boca abajo (sobre el BJURHOLM). Daniel Ramses, se reduce el riesgo del síndrome de muerte súbita infantil (SIDS, por zeina siglas en inglés). · La mayoría de los bebés duermen un total de 18 horas al día. Se despiertan por poco tiempo, katherine mínimo, cada 2 o 3 horas. · Los recién nacidos tienen algunos momentos de sueño [place]. El bebé puede hacer ruidos o parecer inquieto. [place] ocurre aproximadamente a intervalos de 50 a 60 minutos y, por lo general, dura unos pocos minutos. · Al principio, el bebé puede dormir a pesar de los ruidos allyssa. Posteriormente, los ruidos podrían despertarlo. · Cuando el recién nacido se despierta, suele tener hambre y necesita que lo alimenten. Cambio de pañales y hábitos intestinales  · Trate de revisar el pañal de sauceda bebé katherine mínimo cada 2 horas. Si es necesario cambiarlo, hágalo lo antes posible. [place] ayudará a prevenir la dermatitis de pañal.  · Los pañales mojados o sucios de sauceda recién nacido pueden darle pistas acerca de la mickey de sauceda bebé. Los bebés pueden deshidratarse si no reciben suficiente [employer] o de fórmula o si pierden líquido a causa de diarrea, vómitos o fiebre. · Ry los primeros días de kobe, es posible que el bebé tenga unos 3 pañales mojados al día.  Más adelante, usted puede esperar 6 o más pañales mojados al día ry el primer mes de kobe. Puede ser difícil advertir si un pañal está mojado cuando utiliza pañales desechables. Si no logra darse cuenta, coloque un pañuelo de papel en el pañal. [place] se mojará cuando godfrey bebé orine. · Lleve un registro de qué hábitos de evacuación son normales o habituales para godfrey hijo. Cuidado del cordón umbilical  · Mantenga el pañal de godfrey bebé doblado debajo del muñón umbilical. Si eso no funciona gibson, antes de ponerle el pañal a godfrey bebé, recorte un área pequeña cerca de la parte superior del pañal para que el cordón quede al aire. · Para mantener el cordón seco, jay a godfrey bebé un baño de esponja en vez de bañar a godfrey bebé en alejandra sandip o un lavabo. El muñón umbilical debería caerse al cabo de alejandra semana o [place]. ¿Cuándo debe pedir ayuda? Llame al médico de godfrey bebé ahora mismo o busque atención médica inmediata si:    · Godfrey bebé tiene alejandra temperatura rectal inferior a 97.5°F (36.4°C) o de 100.4°F (38°C) o más. Llame si no puede tomarle la temperatura leandro el bebé parece estar caliente.     · Godfrey bebé no moja pañales por un período de 6 horas.     · La piel del bebé o la parte romulo de zeina ojos adquiere un color amarillento más brillante o intenso.     · Observa pus o piel enrojecida en la juanita del muñón del cordón umbilical o alrededor de él. Estas son señales de infección. Preste especial atención a los [employer] mickey de godfrey hijo y asegúrese de comunicarse con godfrey médico si:    · Godfrey bebé no tiene evacuaciones del intestino regulares de acuerdo con godfrey edad.     · Godfrey bebé llora de forma inusual o por un período de tiempo fuera de lo normal.     · Godfrey bebé está despierto [place] Amour y no se despierta para alimentarse, está muy inquieto, parece demasiado cansado para comer o no tiene interés en comer. ¿Dónde puede encontrar más información en inglés?   Katherin De La [place] a [email or web address]  Piedad Melendrez [id number] en la búsqueda para aprender más acerca de \"Sauceda recién nacido en el hogar: Instrucciones de cuidado. \"  Revisado: 27 mayo, 2020               Versión del contenido: 12.6  © 2006-2020 Healthwise, Incorporated. Las instrucciones de cuidado fueron adaptadas bajo licencia por Good Help Connections (which disclaims liability or warranty for this information). Si usted tiene [place] afección médica o sobre estas instrucciones, siempre pregunte a sauceda profesional de mickey. Healthwise, Incorporated niega toda garantía o responsabilidad por sauceda uso de esta información. Patient Education      Zeke Cosier a amamantar  Learning About Starting to Breastfeed  Cómo planear con antelación     Planee por adelantado, antes de que nazca sauceda bebé. Aprenda todo lo que pueda acerca del amamantamiento. [place] le facilitará el amamantamiento. · A principios de sauceda embarazo, hable con sauceda médico o comadrona (partera) sobre el amamantamiento. · Aprenda los conceptos básicos antes de que nazca sauceda bebé. El personal en hospitales y centros de maternidad puede ayudarla a encontrar un especialista en lactancia. Esta persona suele ser Gennette Hailstone enfermera que está capacitada para enseñar y aconsejar a las mujeres sobre el amamantamiento. O gibson, puede kellee [employer] de [place]. · Planee con anticipación los momentos en los que necesitará ayuda después de que nazca sauceda bebé. Muchas mujeres reciben ayuda de zeina familiares y [place]. Algunas se unen a un rome de apoyo para hablar con otras madres que amamantan a zeina bebés. · Compre los suministros que [place] Samia a necesitar. Por ejemplo, almohadillas de lactancia, crema para los pezones, almohadas adicionales y sostenes de lactancia. [place] [employer]. Cómo obtener [place] del hospital o el centro de maternidad  Es importante que tenga [de-identified] de los médicos, las enfermeras y el personal hospitalario que los cuidan a usted y a sauceda bebé.  Antes de que llegue el momento de armando a Trinda Lavonne CityVoter políticas de lactancia de sauceda hospital o centro de maternidad. Busque un hospital o centro de maternidad que tenga un reglamento para:  · Alojamiento conjunto (\"Rooming in\"). Esta política es favorable a que usted tenga a sauceda bebé [employer] habitación. Puede permitirle amamantar con más frecuencia. · Alimentación suplementaria. Informe al personal de que sauceda bebé debe recibir [employer] materna desde sauceda nacimiento. Si el personal le da a sauceda bebé agua, alejandra solución azucarada o [place] de fórmula inmediatamente después de nacer sin razón [place], esto puede dificultarle a usted el amamantamiento. · Chupones o pezones artificiales. El personal no debería darle estos artículos a sauceda bebé recién nacido. Podrían interferir en la lactancia. · Seguimiento. Pregunte si el hospital puede ayudarla con problemas de amamantamiento alejandra vez que [place] a sauceda hogar. Trate de [employer] grupos de apoyo u otro tipo de contacto. Altrey Morales le dylon útiles si necesita ayuda para establecer y mantener alejandra rutina de amamantamiento. La primera naatsha  Lo mejor es comenzar el amamantamiento dentro de 1 hora después del Gerardo. En cada natasha, usted pasa por estos pasos básicos:  · Prepárese para la alimentación. Manténgase calmada y [place], y trate de no distraerse. Tenga agua o jugo a mano para kellee usted. [employer] o michele almohadas para ayudar a sostener al [employer] se [place]. · Encuentre alejandra posición para amamantar que sea cómoda tanto para usted katherine para sauceda bebé. Los ejemplos incluyen la posición de cuna y la posición de fútbol americano. Asegúrese de que la juwan y el pecho del bebé estén alineados y orientados hacia sauceda pecho. Es mejor cambiar el seno con el que Gisel Brome. · Consiga que el bebé se prenda apropiadamente. La boca del bebé debe estar gibson abierta, katherine en un bostezo, por lo que puede que tenga que tocar delicadamente el centro del labio inferior de sauceda bebé.  Cuando la boca del bebé esté gibson abierta, acerque al bebé rápidamente al pezón y a la areola. La areola es la juanita oscura alrededor del pezón. · Yovani al bebé alejandra natasha completa. Deje que el bebé decida ry cuánto tiempo alimentarse. Asegúrese de hacer eructar al bebé después de alimentarse de cada seno. En los primeros días después del nacimiento, miri senos elaboran un líquido espeso de color amarillo llamado calostro. [place] líquido le proporciona al bebé nutrientes y anticuerpos para combatir las infecciones. Es todo lo que los bebés necesitan al principio. Miri senos se llenarán de [employer] unos días después del nacimiento. Hable de inmediato con sauceda médico, comadrona o especialista en lactancia si tiene problemas y no sabe qué hacer. Con qué frecuencia debe amamantar  Planifique amamantar a sauceda bebé cada vez que lo pida en lugar de establecer un horario estricto. Las primeras 2 semanas, esté preparada para amamantar al menos 8 veces en un período de 24 horas. Los primeros días, es posible que tenga que despertar a un bebé dormido para alimentarlo. Si amamanta con más frecuencia, esto ayudará a que los senos (mamas) produzcan más [employer]. Después de la vuelta al hogar  Después de que vuelva a sauceda hogar, no dude en llamar a sauceda médico, comadrona o especialista en lactancia si tiene preguntas. Debería hacer esto incluso si no sabe cuál es el problema. Estos profesionales están acostumbrados a que [employer] padres de recién nacidos. Pueden ayudarla a averiguar si hay un problema y, si es así, a solucionarlo. Daphnie planes para cuando deba estar separada de sauceda bebé. Use un sacaleches para juntar leche por adelantado. Usted puede guardar [employer] refrigerador o el congelador. Así, estará lista cuando alguien más cuide a sauceda bebé. Los especialistas recomiendan esperar aproximadamente un mes hasta que el amamantamiento esté yendo gibson antes de ofrecer un biberón. Amamantar es alejandra habilidad que se aprende y se torna más fácil con el tiempo.  Cindy Juarez tiene más probabilidades de tener éxito si planea con anticipación, aprende las técnicas básicas y [facility] [place] dónde [place] y [de-identified]. ¿Dónde puede encontrar más información en inglés? Exie Laundry a http://www.gray.com/  Abiodun S424 en la búsqueda para aprender más acerca de \"Aprenda sobre cómo empezar a amamantar. \"  Revisado: 11 febrero, 2020               Versión del contenido: 12.6  © 5400-9307 Healthwise, Incorporated. Las instrucciones de cuidado fueron adaptadas bajo licencia por [facility] (which disclaims liability or warranty for this information). Si usted tiene [place] afección médica o sobre estas instrucciones, siempre pregunte a sauceda profesional de mickey. Healthwise, Incorporated niega toda garantía o responsabilidad por sauceda uso de esta información. Patient Education        Gerhardt Dakin métodos anticonceptivos después del parto  Learning About Birth Control After Childbirth  ¿Qué es un método anticonceptivo? Un método anticonceptivo es cualquier método usado para prevenir el embarazo. Thais Clarity de decir método anticonceptivo es anticoncepción. Espere hasta miah sanado (aproximadamente de 4 a 6 semanas) antes de tener relaciones sexuales. Si tiene [employer] sin un método anticonceptivo, existe la posibilidad de [place]. [place] es cierto incluso si todavía no ha vuelto a tener zeina períodos. Aun si amamanta, usted todavía puede [place]annick. La mayoría de los especialistas sugieren esperar al menos 18 meses para volver a quedar embarazada para reducir los riesgos para usted y el bebé. Usted puede tener motivos para quedar [employer], de modo que hable con sauceda médico acerca de los riesgos y los beneficios. La única manera ozuna de impedir otro embarazo es no tener relaciones sexuales. Mic encontrar un buen método anticonceptivo con el que usted se sienta cómoda puede ayudarle a evitar un embarazo no planeado.  Sauceda médico puede ayudarle a elegir el método anticonceptivo que sea adecuado para usted. ¿Cuáles son los tipos de métodos anticonceptivos? · Los anticonceptivos reversibles de larga duración (LARC, por zeina siglas en inglés) son los métodos reversibles más eficaces que puede usar para prevenir el embarazo. Si usted decide que desea quedar embarazada, tienen que extraérselos. Los LARC son implantes y dispositivos intrauterinos (DIU). Mientras están colocados, suelen prevenir el embarazo por años. ? Los implantes se colocan debajo de la piel del brazo. [place] puede hacerse inmediatamente después de armando a nita. Liberan la hormona progestina y previenen el embarazo ry aproximadamente 3 años. ? Un médico coloca los DIU en el [place] puede hacerse damon después de armando a nita, si usted y sauceda médico hablan sobre ello de [facility]. O puede hacerse en alejandra visita médica más adelante. Hay dos tipos principales de DIU: el DIU de cobre y el DIU hormonal. El DIU hormonal libera progestina. Los DIU previenen el embarazo por 3 a 10 años, dependiendo del tipo. · Los métodos hormonales son muy buenos para prevenir el [facility]. Las píldoras anticonceptivas combinadas (\"la píldora\"), los parches dérmicos y los anillos vaginales [address] hormonas estrógeno y progestina. Depo-Provera es alejandra inyección que se aplica cada 3 meses. Las inyecciones, las minipíldoras, los DIU y los implantes liberan solo progestina. Es mejor usar opciones de progestina solamente las primeras semanas después de armando a nita. · Los métodos de diane no previenen el embarazo patton gibson katherine lo [employer], los DIU o los métodos hormonales. Los métodos de diane [employer] condones, los diafragmas y los capuchones cervicales. Debe [employer] métodos de diane cada vez que tiene relaciones sexuales. Si usted tenía un diafragma o un capuchón cervical antes de quedar embarazada, hable con sauceda médico para flako si necesita un tamaño diferente.  Los condones pueden usarse en cualquier momento después de armando a nita. · La planificación familiar natural se conoce también katherine método del calendario de fertilidad o método del ritmo. Puede funcionar si usted y sauceda damir tienen Aidee Brewer y si usted tiene un ciclo regular de ovulación. Mic no funciona mejor que otros métodos anticonceptivos. Usted tendrá que llevar buenos registros para saber cuándo hay mayores probabilidades de [place]. Y ry esos días, tendrá que usar un método de diane o no tener relaciones sexuales. · Los métodos anticonceptivos permanentes (esterilización) le proporcionan alejandra protección duradera contra el embarazo. Un hombre se puede realizar alejandra vasectomía. Alejandra aaliyah puede ligarse las trompas (ligadura de trompas). Mci esto es solo alejandra buena opción si está ozuna de que no quiere tener más hijos. · Los anticonceptivos de [place] son métodos de respaldo para prevenir el embarazo si no usó un método anticonceptivo o si un condón se rompe. El método anticonceptivo de urgencia más eficaz lo receta un médico. [place] puede ser un DIU de cobre (colocado por un médico) o alejandra pastilla de venta bajo receta. Usted también puede adquirir pastillas anticonceptivas de urgencia sin Pamla Milch en la mayoría de las [facility]. ¿Cómo puede obtener un método anticonceptivo? · Puede comprar:  ? Condones y espermicidas sin receta en farmacias, en línea y en muchas tiendas de comestibles. ? Algunos anticonceptivos de [place] de venta yudy en la mayoría de las [place]. · Usted necesita flako a un médico o ir a alejandra clínica de planificación familiar para:  ? Adolfo Boyce receta para píldoras anticonceptivas y otros métodos que usan hormonas. ? Hacerse colocar un implante o un DIU, incluido el tipo de DIU usado para la anticoncepción de [place].   ? Que le apliquen alejandra inyección hormonal.  ? Adolfo Boyce receta para un diafragma o un capuchón cervical.  ? Obtener alejandra receta para determinados tipos de anticonceptivos de [place]. La atención de seguimiento es alejandra parte clave de sauceda tratamiento y seguridad. Asegúrese de hacer y acudir a todas las citas, y llame a sauceda médico si está teniendo problemas. También es alejandra buena idea saber los resultados de zeina exámenes y mantener alejandra lista de los medicamentos que natasha. ¿Dónde puede encontrar más información en inglés? Simona Chance a [email or web address]-yazan.[email or web address]  Abiodun L621 en la búsqueda para aprender más acerca de \"Aprenda sobre métodos anticonceptivos después del [place]. \"  Revisado: 11 febrero, 2020               Versión del contenido: 12.6  © 3999-7991 [employer]. Las instrucciones de cuidado fueron adaptadas bajo licencia por [facility] (which disclaims liability or warranty for this information). Si [facility] tiene [place] afección médica o sobre estas instrucciones, siempre pregunte a sauceda profesional de mickey. [employer], [employer] niega toda garantía o responsabilidad por sauceda uso de esta información.

## 2020-10-18 NOTE — ROUTINE PROCESS
Bedside and Verbal shift change report given to M. Isiah Skiff (oncoming nurse) by Reynold Gonzalez . Rukhsana King RN (offgoing nurse). Report included the following information SBAR, Kardex, Intake/Output, MAR and Recent Results.

## 2020-10-18 NOTE — PROGRESS NOTES
Patient discharged to home. Education completed. Patient reported she had no more questions. Bands verified on patient and infant, see footprint sheet. Infant placed in carseat by parent.   Prescriptions:colace, iron, and motrin called into pharmacy by MD.

## 2020-10-19 ENCOUNTER — DOCUMENTATION ONLY (OUTPATIENT)
Dept: OBGYN | Age: 20
End: 2020-10-19

## 2020-10-19 NOTE — PROGRESS NOTES
Attempted to call [facility] to schedule  baby's first appointment but there was no availability for the patient to be seen until Thursday, 10/22. Called [facility] ([phone]) answering service and left a message with a staff member for them to call patient and schedule an appointment for baby girl to be seen today or tomorrow.     Dr. Jazzy Short also called the patient and explained that [address] should call to set up this appointment, but if she does not hear back by mid-day she should call to set up the  appointment. Provided patient with the phone number and address for [facility]. Patient expressed verbal understanding and agreement.

## 2022-03-19 PROBLEM — Z34.90 PREGNANCY: Status: ACTIVE | Noted: 2020-10-16

## 2023-05-22 RX ORDER — IBUPROFEN 800 MG/1
800 TABLET ORAL EVERY 8 HOURS PRN
COMMUNITY
Start: 2020-10-18